# Patient Record
Sex: FEMALE | Race: OTHER | HISPANIC OR LATINO
[De-identification: names, ages, dates, MRNs, and addresses within clinical notes are randomized per-mention and may not be internally consistent; named-entity substitution may affect disease eponyms.]

---

## 2017-11-08 ENCOUNTER — APPOINTMENT (OUTPATIENT)
Dept: SURGICAL ONCOLOGY | Facility: CLINIC | Age: 47
End: 2017-11-08
Payer: COMMERCIAL

## 2017-11-08 VITALS
WEIGHT: 134 LBS | BODY MASS INDEX: 24.66 KG/M2 | OXYGEN SATURATION: 98 % | HEIGHT: 62 IN | TEMPERATURE: 98.1 F | DIASTOLIC BLOOD PRESSURE: 83 MMHG | HEART RATE: 79 BPM | SYSTOLIC BLOOD PRESSURE: 132 MMHG

## 2017-11-08 DIAGNOSIS — Z80.3 FAMILY HISTORY OF MALIGNANT NEOPLASM OF BREAST: ICD-10-CM

## 2017-11-08 DIAGNOSIS — Z87.898 PERSONAL HISTORY OF OTHER SPECIFIED CONDITIONS: ICD-10-CM

## 2017-11-08 DIAGNOSIS — Z87.19 PERSONAL HISTORY OF OTHER DISEASES OF THE DIGESTIVE SYSTEM: ICD-10-CM

## 2017-11-08 PROCEDURE — 99205 OFFICE O/P NEW HI 60 MIN: CPT

## 2017-11-11 ENCOUNTER — TRANSCRIPTION ENCOUNTER (OUTPATIENT)
Age: 47
End: 2017-11-11

## 2017-11-30 ENCOUNTER — CHART COPY (OUTPATIENT)
Age: 47
End: 2017-11-30

## 2017-12-07 ENCOUNTER — LABORATORY RESULT (OUTPATIENT)
Age: 47
End: 2017-12-07

## 2017-12-07 ENCOUNTER — APPOINTMENT (OUTPATIENT)
Dept: SURGICAL ONCOLOGY | Facility: CLINIC | Age: 47
End: 2017-12-07
Payer: COMMERCIAL

## 2017-12-07 DIAGNOSIS — N60.01 SOLITARY CYST OF RIGHT BREAST: ICD-10-CM

## 2017-12-07 DIAGNOSIS — N60.02 SOLITARY CYST OF RIGHT BREAST: ICD-10-CM

## 2017-12-07 PROCEDURE — 10022: CPT

## 2018-03-12 ENCOUNTER — APPOINTMENT (OUTPATIENT)
Dept: SURGICAL ONCOLOGY | Facility: CLINIC | Age: 48
End: 2018-03-12
Payer: COMMERCIAL

## 2018-03-12 VITALS
HEIGHT: 62 IN | SYSTOLIC BLOOD PRESSURE: 151 MMHG | RESPIRATION RATE: 15 BRPM | BODY MASS INDEX: 23.92 KG/M2 | HEART RATE: 87 BPM | DIASTOLIC BLOOD PRESSURE: 101 MMHG | WEIGHT: 130 LBS | OXYGEN SATURATION: 95 %

## 2018-03-12 DIAGNOSIS — C41.9 MALIGNANT NEOPLASM OF BONE AND ARTICULAR CARTILAGE, UNSPECIFIED: ICD-10-CM

## 2018-03-12 PROCEDURE — 99214 OFFICE O/P EST MOD 30 MIN: CPT

## 2018-03-13 ENCOUNTER — OUTPATIENT (OUTPATIENT)
Dept: OUTPATIENT SERVICES | Facility: HOSPITAL | Age: 48
LOS: 1 days | End: 2018-03-13
Payer: COMMERCIAL

## 2018-03-13 ENCOUNTER — RESULT REVIEW (OUTPATIENT)
Age: 48
End: 2018-03-13

## 2018-03-13 ENCOUNTER — APPOINTMENT (OUTPATIENT)
Dept: MAMMOGRAPHY | Facility: IMAGING CENTER | Age: 48
End: 2018-03-13
Payer: COMMERCIAL

## 2018-03-13 ENCOUNTER — APPOINTMENT (OUTPATIENT)
Dept: ULTRASOUND IMAGING | Facility: IMAGING CENTER | Age: 48
End: 2018-03-13

## 2018-03-13 ENCOUNTER — APPOINTMENT (OUTPATIENT)
Dept: ULTRASOUND IMAGING | Facility: IMAGING CENTER | Age: 48
End: 2018-03-13
Payer: COMMERCIAL

## 2018-03-13 DIAGNOSIS — C41.9 MALIGNANT NEOPLASM OF BONE AND ARTICULAR CARTILAGE, UNSPECIFIED: ICD-10-CM

## 2018-03-13 PROCEDURE — 77065 DX MAMMO INCL CAD UNI: CPT

## 2018-03-13 PROCEDURE — 76641 ULTRASOUND BREAST COMPLETE: CPT

## 2018-03-13 PROCEDURE — 19083 BX BREAST 1ST LESION US IMAG: CPT | Mod: LT

## 2018-03-13 PROCEDURE — 88305 TISSUE EXAM BY PATHOLOGIST: CPT | Mod: 26

## 2018-03-13 PROCEDURE — 76641 ULTRASOUND BREAST COMPLETE: CPT | Mod: 26,LT

## 2018-03-13 PROCEDURE — G0279: CPT | Mod: 26

## 2018-03-13 PROCEDURE — 77065 DX MAMMO INCL CAD UNI: CPT | Mod: 26,LT

## 2018-03-13 PROCEDURE — 88377 M/PHMTRC ALYS ISHQUANT/SEMIQ: CPT

## 2018-03-13 PROCEDURE — G0279: CPT

## 2018-03-13 PROCEDURE — 88377 M/PHMTRC ALYS ISHQUANT/SEMIQ: CPT | Mod: 26

## 2018-03-13 PROCEDURE — 77061 BREAST TOMOSYNTHESIS UNI: CPT | Mod: 26,LT

## 2018-03-13 PROCEDURE — A4648: CPT

## 2018-03-13 PROCEDURE — 77065 DX MAMMO INCL CAD UNI: CPT | Mod: 26,77,LT

## 2018-03-13 PROCEDURE — 88305 TISSUE EXAM BY PATHOLOGIST: CPT

## 2018-03-13 PROCEDURE — 19083 BX BREAST 1ST LESION US IMAG: CPT

## 2018-03-13 PROCEDURE — 88360 TUMOR IMMUNOHISTOCHEM/MANUAL: CPT | Mod: 26

## 2018-03-13 PROCEDURE — 88360 TUMOR IMMUNOHISTOCHEM/MANUAL: CPT

## 2018-03-16 ENCOUNTER — APPOINTMENT (OUTPATIENT)
Dept: NUCLEAR MEDICINE | Facility: IMAGING CENTER | Age: 48
End: 2018-03-16

## 2018-03-21 ENCOUNTER — RX RENEWAL (OUTPATIENT)
Age: 48
End: 2018-03-21

## 2018-03-30 ENCOUNTER — APPOINTMENT (OUTPATIENT)
Dept: ORTHOPEDIC SURGERY | Facility: CLINIC | Age: 48
End: 2018-03-30
Payer: COMMERCIAL

## 2018-03-30 VITALS
HEIGHT: 62 IN | DIASTOLIC BLOOD PRESSURE: 80 MMHG | SYSTOLIC BLOOD PRESSURE: 130 MMHG | BODY MASS INDEX: 23.92 KG/M2 | WEIGHT: 130 LBS | HEART RATE: 94 BPM

## 2018-03-30 PROCEDURE — 73552 X-RAY EXAM OF FEMUR 2/>: CPT | Mod: LT

## 2018-03-30 PROCEDURE — 99204 OFFICE O/P NEW MOD 45 MIN: CPT

## 2018-03-30 PROCEDURE — 73060 X-RAY EXAM OF HUMERUS: CPT | Mod: RT

## 2018-03-30 PROCEDURE — 72170 X-RAY EXAM OF PELVIS: CPT

## 2018-04-30 ENCOUNTER — APPOINTMENT (OUTPATIENT)
Dept: ORTHOPEDIC SURGERY | Facility: CLINIC | Age: 48
End: 2018-04-30
Payer: COMMERCIAL

## 2018-04-30 PROCEDURE — 99213 OFFICE O/P EST LOW 20 MIN: CPT

## 2018-07-23 ENCOUNTER — APPOINTMENT (OUTPATIENT)
Dept: ORTHOPEDIC SURGERY | Facility: CLINIC | Age: 48
End: 2018-07-23
Payer: COMMERCIAL

## 2018-07-23 VITALS
HEIGHT: 62 IN | BODY MASS INDEX: 24.29 KG/M2 | HEART RATE: 92 BPM | WEIGHT: 132 LBS | DIASTOLIC BLOOD PRESSURE: 73 MMHG | SYSTOLIC BLOOD PRESSURE: 107 MMHG

## 2018-07-23 PROCEDURE — 72170 X-RAY EXAM OF PELVIS: CPT

## 2018-07-23 PROCEDURE — 99213 OFFICE O/P EST LOW 20 MIN: CPT

## 2018-08-18 ENCOUNTER — INPATIENT (INPATIENT)
Facility: HOSPITAL | Age: 48
LOS: 1 days | Discharge: HOME | DRG: 603 | End: 2018-08-20
Attending: STUDENT IN AN ORGANIZED HEALTH CARE EDUCATION/TRAINING PROGRAM | Admitting: STUDENT IN AN ORGANIZED HEALTH CARE EDUCATION/TRAINING PROGRAM
Payer: COMMERCIAL

## 2018-08-18 VITALS
SYSTOLIC BLOOD PRESSURE: 153 MMHG | HEART RATE: 96 BPM | OXYGEN SATURATION: 100 % | RESPIRATION RATE: 18 BRPM | TEMPERATURE: 98 F | DIASTOLIC BLOOD PRESSURE: 92 MMHG

## 2018-08-18 LAB
ALBUMIN SERPL ELPH-MCNC: 3.8 G/DL — SIGNIFICANT CHANGE UP (ref 3.3–5)
ALP SERPL-CCNC: 49 U/L — SIGNIFICANT CHANGE UP (ref 40–120)
ALT FLD-CCNC: 21 U/L — SIGNIFICANT CHANGE UP (ref 10–45)
ANION GAP SERPL CALC-SCNC: 9 MMOL/L — SIGNIFICANT CHANGE UP (ref 5–17)
APTT BLD: 30 SEC — SIGNIFICANT CHANGE UP (ref 27.5–37.4)
AST SERPL-CCNC: 29 U/L — SIGNIFICANT CHANGE UP (ref 10–40)
BASOPHILS # BLD AUTO: 0 K/UL — SIGNIFICANT CHANGE UP (ref 0–0.2)
BASOPHILS NFR BLD AUTO: 0.3 % — SIGNIFICANT CHANGE UP (ref 0–2)
BILIRUB SERPL-MCNC: 0.3 MG/DL — SIGNIFICANT CHANGE UP (ref 0.2–1.2)
BUN SERPL-MCNC: 11 MG/DL — SIGNIFICANT CHANGE UP (ref 7–23)
CALCIUM SERPL-MCNC: 9 MG/DL — SIGNIFICANT CHANGE UP (ref 8.4–10.5)
CHLORIDE SERPL-SCNC: 104 MMOL/L — SIGNIFICANT CHANGE UP (ref 96–108)
CK MB CFR SERPL CALC: 1 NG/ML — SIGNIFICANT CHANGE UP (ref 0–3.8)
CO2 SERPL-SCNC: 26 MMOL/L — SIGNIFICANT CHANGE UP (ref 22–31)
CREAT SERPL-MCNC: 0.67 MG/DL — SIGNIFICANT CHANGE UP (ref 0.5–1.3)
EOSINOPHIL # BLD AUTO: 0 K/UL — SIGNIFICANT CHANGE UP (ref 0–0.5)
EOSINOPHIL NFR BLD AUTO: 0.4 % — SIGNIFICANT CHANGE UP (ref 0–6)
GLUCOSE SERPL-MCNC: 105 MG/DL — HIGH (ref 70–99)
HCT VFR BLD CALC: 29.8 % — LOW (ref 34.5–45)
HGB BLD-MCNC: 10.1 G/DL — LOW (ref 11.5–15.5)
INR BLD: 1.01 RATIO — SIGNIFICANT CHANGE UP (ref 0.88–1.16)
LYMPHOCYTES # BLD AUTO: 0.9 K/UL — LOW (ref 1–3.3)
LYMPHOCYTES # BLD AUTO: 12.7 % — LOW (ref 13–44)
MCHC RBC-ENTMCNC: 33.5 PG — SIGNIFICANT CHANGE UP (ref 27–34)
MCHC RBC-ENTMCNC: 33.9 GM/DL — SIGNIFICANT CHANGE UP (ref 32–36)
MCV RBC AUTO: 98.8 FL — SIGNIFICANT CHANGE UP (ref 80–100)
MONOCYTES # BLD AUTO: 0.6 K/UL — SIGNIFICANT CHANGE UP (ref 0–0.9)
MONOCYTES NFR BLD AUTO: 8.9 % — SIGNIFICANT CHANGE UP (ref 2–14)
NEUTROPHILS # BLD AUTO: 5.4 K/UL — SIGNIFICANT CHANGE UP (ref 1.8–7.4)
NEUTROPHILS NFR BLD AUTO: 77.7 % — HIGH (ref 43–77)
NT-PROBNP SERPL-SCNC: 48 PG/ML — SIGNIFICANT CHANGE UP (ref 0–300)
PLATELET # BLD AUTO: 376 K/UL — SIGNIFICANT CHANGE UP (ref 150–400)
POTASSIUM SERPL-MCNC: 4.1 MMOL/L — SIGNIFICANT CHANGE UP (ref 3.5–5.3)
POTASSIUM SERPL-SCNC: 4.1 MMOL/L — SIGNIFICANT CHANGE UP (ref 3.5–5.3)
PROT SERPL-MCNC: 6.1 G/DL — SIGNIFICANT CHANGE UP (ref 6–8.3)
PROTHROM AB SERPL-ACNC: 11 SEC — SIGNIFICANT CHANGE UP (ref 9.8–12.7)
RBC # BLD: 3.01 M/UL — LOW (ref 3.8–5.2)
RBC # FLD: 17.6 % — HIGH (ref 10.3–14.5)
SODIUM SERPL-SCNC: 139 MMOL/L — SIGNIFICANT CHANGE UP (ref 135–145)
TROPONIN T, HIGH SENSITIVITY RESULT: <6 NG/L — SIGNIFICANT CHANGE UP (ref 0–51)
WBC # BLD: 6.9 K/UL — SIGNIFICANT CHANGE UP (ref 3.8–10.5)
WBC # FLD AUTO: 6.9 K/UL — SIGNIFICANT CHANGE UP (ref 3.8–10.5)

## 2018-08-18 PROCEDURE — 99285 EMERGENCY DEPT VISIT HI MDM: CPT | Mod: 25

## 2018-08-18 PROCEDURE — 93010 ELECTROCARDIOGRAM REPORT: CPT

## 2018-08-18 PROCEDURE — 71046 X-RAY EXAM CHEST 2 VIEWS: CPT | Mod: 26

## 2018-08-18 NOTE — ED ADULT NURSE NOTE - NSIMPLEMENTINTERV_GEN_ALL_ED
Implemented All Universal Safety Interventions:  [place] to call system. Call bell, personal items and telephone within reach. Instruct patient to call for assistance. Room bathroom lighting operational. Non-slip footwear when patient is off stretcher. Physically safe environment: no spills, clutter or unnecessary equipment. Stretcher in lowest position, wheels locked, appropriate side rails in place.

## 2018-08-18 NOTE — ED PROVIDER NOTE - MUSCULOSKELETAL MINIMAL EXAM
diffuse edema in legs L leg bigger than R, L ext warm and erythema, no open sores noted, some tenderness L inguinal nodes

## 2018-08-18 NOTE — ED ADULT TRIAGE NOTE - CHIEF COMPLAINT QUOTE
hx of breast ca; c/o L leg redness/swelling pain x1 month; pt seen by MD, had US done, no DVT as per pt

## 2018-08-18 NOTE — ED PROVIDER NOTE - ATTENDING CONTRIBUTION TO CARE
I have seen and evaluated this patient with the [place] practice clinician.   I agree with the findings  unless other wise stated. I have amended notes where needed.  After my face to face bedside evaluation, I am notin yr old F being treated for breast CA, last chemo 2 weeks ago, presenting for edema. Possible cellulitis vs DVT. Will do labs.  admission for antibiotics.

## 2018-08-18 NOTE — ED PROVIDER NOTE - OBJECTIVE STATEMENT
48 yr old F with PMHx of breast CA. Presents for edema. Pt notes bilateral leg swelling, redness, and pain, L side more than R side.

## 2018-08-18 NOTE — ED PROVIDER NOTE - MEDICAL DECISION MAKING DETAILS
48 yr old F being treated for breast CA, last chemo 2 weeks ago, presenting for edema. Possible cellulitis vs DVT. Will do labs. Possible admission for antibiotics.

## 2018-08-19 DIAGNOSIS — C50.919 MALIGNANT NEOPLASM OF UNSPECIFIED SITE OF UNSPECIFIED FEMALE BREAST: ICD-10-CM

## 2018-08-19 DIAGNOSIS — L03.119 CELLULITIS OF UNSPECIFIED PART OF LIMB: ICD-10-CM

## 2018-08-19 DIAGNOSIS — Z29.9 ENCOUNTER FOR PROPHYLACTIC MEASURES, UNSPECIFIED: ICD-10-CM

## 2018-08-19 DIAGNOSIS — L03.116 CELLULITIS OF LEFT LOWER LIMB: ICD-10-CM

## 2018-08-19 LAB
ALBUMIN SERPL ELPH-MCNC: 3.5 G/DL — SIGNIFICANT CHANGE UP (ref 3.3–5)
ALP SERPL-CCNC: 42 U/L — SIGNIFICANT CHANGE UP (ref 40–120)
ALT FLD-CCNC: 18 U/L — SIGNIFICANT CHANGE UP (ref 10–45)
ANION GAP SERPL CALC-SCNC: 10 MMOL/L — SIGNIFICANT CHANGE UP (ref 5–17)
AST SERPL-CCNC: 25 U/L — SIGNIFICANT CHANGE UP (ref 10–40)
BASE EXCESS BLDV CALC-SCNC: 3 MMOL/L — HIGH (ref -2–2)
BASOPHILS # BLD AUTO: 0 K/UL — SIGNIFICANT CHANGE UP (ref 0–0.2)
BASOPHILS NFR BLD AUTO: 0.4 % — SIGNIFICANT CHANGE UP (ref 0–2)
BILIRUB SERPL-MCNC: 0.3 MG/DL — SIGNIFICANT CHANGE UP (ref 0.2–1.2)
BUN SERPL-MCNC: 13 MG/DL — SIGNIFICANT CHANGE UP (ref 7–23)
CA-I SERPL-SCNC: 1.25 MMOL/L — SIGNIFICANT CHANGE UP (ref 1.12–1.3)
CALCIUM SERPL-MCNC: 8.5 MG/DL — SIGNIFICANT CHANGE UP (ref 8.4–10.5)
CHLORIDE BLDV-SCNC: 105 MMOL/L — SIGNIFICANT CHANGE UP (ref 96–108)
CHLORIDE SERPL-SCNC: 105 MMOL/L — SIGNIFICANT CHANGE UP (ref 96–108)
CO2 BLDV-SCNC: 30 MMOL/L — SIGNIFICANT CHANGE UP (ref 22–30)
CO2 SERPL-SCNC: 22 MMOL/L — SIGNIFICANT CHANGE UP (ref 22–31)
CREAT SERPL-MCNC: 0.58 MG/DL — SIGNIFICANT CHANGE UP (ref 0.5–1.3)
EOSINOPHIL # BLD AUTO: 0.1 K/UL — SIGNIFICANT CHANGE UP (ref 0–0.5)
EOSINOPHIL NFR BLD AUTO: 0.9 % — SIGNIFICANT CHANGE UP (ref 0–6)
GAS PNL BLDV: 141 MMOL/L — SIGNIFICANT CHANGE UP (ref 136–145)
GAS PNL BLDV: SIGNIFICANT CHANGE UP
GAS PNL BLDV: SIGNIFICANT CHANGE UP
GLUCOSE BLDV-MCNC: 95 MG/DL — SIGNIFICANT CHANGE UP (ref 70–99)
GLUCOSE SERPL-MCNC: 93 MG/DL — SIGNIFICANT CHANGE UP (ref 70–99)
HCO3 BLDV-SCNC: 28 MMOL/L — SIGNIFICANT CHANGE UP (ref 21–29)
HCT VFR BLD CALC: 28 % — LOW (ref 34.5–45)
HCT VFR BLDA CALC: 29 % — LOW (ref 39–50)
HGB BLD CALC-MCNC: 9.4 G/DL — LOW (ref 11.5–15.5)
HGB BLD-MCNC: 9.5 G/DL — LOW (ref 11.5–15.5)
LACTATE BLDV-MCNC: 0.6 MMOL/L — LOW (ref 0.7–2)
LYMPHOCYTES # BLD AUTO: 0.8 K/UL — LOW (ref 1–3.3)
LYMPHOCYTES # BLD AUTO: 14 % — SIGNIFICANT CHANGE UP (ref 13–44)
MAGNESIUM SERPL-MCNC: 2.2 MG/DL — SIGNIFICANT CHANGE UP (ref 1.6–2.6)
MCHC RBC-ENTMCNC: 33.1 PG — SIGNIFICANT CHANGE UP (ref 27–34)
MCHC RBC-ENTMCNC: 33.9 GM/DL — SIGNIFICANT CHANGE UP (ref 32–36)
MCV RBC AUTO: 97.5 FL — SIGNIFICANT CHANGE UP (ref 80–100)
MONOCYTES # BLD AUTO: 0.6 K/UL — SIGNIFICANT CHANGE UP (ref 0–0.9)
MONOCYTES NFR BLD AUTO: 9.7 % — SIGNIFICANT CHANGE UP (ref 2–14)
NEUTROPHILS # BLD AUTO: 4.3 K/UL — SIGNIFICANT CHANGE UP (ref 1.8–7.4)
NEUTROPHILS NFR BLD AUTO: 75 % — SIGNIFICANT CHANGE UP (ref 43–77)
OTHER CELLS CSF MANUAL: 10 ML/DL — LOW (ref 18–22)
PCO2 BLDV: 49 MMHG — SIGNIFICANT CHANGE UP (ref 35–50)
PH BLDV: 7.38 — SIGNIFICANT CHANGE UP (ref 7.35–7.45)
PHOSPHATE SERPL-MCNC: 3.6 MG/DL — SIGNIFICANT CHANGE UP (ref 2.5–4.5)
PLATELET # BLD AUTO: 345 K/UL — SIGNIFICANT CHANGE UP (ref 150–400)
PO2 BLDV: 43 MMHG — SIGNIFICANT CHANGE UP (ref 25–45)
POTASSIUM BLDV-SCNC: 3.8 MMOL/L — SIGNIFICANT CHANGE UP (ref 3.5–5.3)
POTASSIUM SERPL-MCNC: 3.9 MMOL/L — SIGNIFICANT CHANGE UP (ref 3.5–5.3)
POTASSIUM SERPL-SCNC: 3.9 MMOL/L — SIGNIFICANT CHANGE UP (ref 3.5–5.3)
PROCALCITONIN SERPL-MCNC: 0.02 NG/ML — SIGNIFICANT CHANGE UP (ref 0.02–0.1)
PROT SERPL-MCNC: 5.6 G/DL — LOW (ref 6–8.3)
RBC # BLD: 2.87 M/UL — LOW (ref 3.8–5.2)
RBC # FLD: 16.9 % — HIGH (ref 10.3–14.5)
SAO2 % BLDV: 73 % — SIGNIFICANT CHANGE UP (ref 67–88)
SODIUM SERPL-SCNC: 137 MMOL/L — SIGNIFICANT CHANGE UP (ref 135–145)
WBC # BLD: 5.8 K/UL — SIGNIFICANT CHANGE UP (ref 3.8–10.5)
WBC # FLD AUTO: 5.8 K/UL — SIGNIFICANT CHANGE UP (ref 3.8–10.5)

## 2018-08-19 PROCEDURE — 93970 EXTREMITY STUDY: CPT | Mod: 26

## 2018-08-19 PROCEDURE — 99223 1ST HOSP IP/OBS HIGH 75: CPT

## 2018-08-19 RX ORDER — AMPICILLIN SODIUM AND SULBACTAM SODIUM 250; 125 MG/ML; MG/ML
3 INJECTION, POWDER, FOR SUSPENSION INTRAMUSCULAR; INTRAVENOUS EVERY 6 HOURS
Qty: 0 | Refills: 0 | Status: DISCONTINUED | OUTPATIENT
Start: 2018-08-19 | End: 2018-08-20

## 2018-08-19 RX ORDER — ENOXAPARIN SODIUM 100 MG/ML
40 INJECTION SUBCUTANEOUS EVERY 24 HOURS
Qty: 0 | Refills: 0 | Status: DISCONTINUED | OUTPATIENT
Start: 2018-08-19 | End: 2018-08-20

## 2018-08-19 RX ORDER — AMPICILLIN SODIUM AND SULBACTAM SODIUM 250; 125 MG/ML; MG/ML
3 INJECTION, POWDER, FOR SUSPENSION INTRAMUSCULAR; INTRAVENOUS ONCE
Qty: 0 | Refills: 0 | Status: COMPLETED | OUTPATIENT
Start: 2018-08-19 | End: 2018-08-19

## 2018-08-19 RX ADMIN — AMPICILLIN SODIUM AND SULBACTAM SODIUM 200 GRAM(S): 250; 125 INJECTION, POWDER, FOR SUSPENSION INTRAMUSCULAR; INTRAVENOUS at 12:33

## 2018-08-19 RX ADMIN — AMPICILLIN SODIUM AND SULBACTAM SODIUM 200 GRAM(S): 250; 125 INJECTION, POWDER, FOR SUSPENSION INTRAMUSCULAR; INTRAVENOUS at 01:35

## 2018-08-19 RX ADMIN — AMPICILLIN SODIUM AND SULBACTAM SODIUM 200 GRAM(S): 250; 125 INJECTION, POWDER, FOR SUSPENSION INTRAMUSCULAR; INTRAVENOUS at 18:23

## 2018-08-19 RX ADMIN — Medication 100 MILLIGRAM(S): at 06:50

## 2018-08-19 NOTE — H&P ADULT - NSHPLABSRESULTS_GEN_ALL_CORE
Labs personally reviewed  no leukocytosis, mild anemia, rest of cbc unrevealing, coags wnl, cmp wnl, cardiac enzymes wnl, probnp wnl, procalcitonin wnl.   Imaging personally reviewed CXR prelim clear lungs. Doppler b/l LE negative for DVT.  EKG personally reviewed NSR no overt ischemia.

## 2018-08-19 NOTE — CONSULT NOTE ADULT - CONSULT REASON
metastatic breast cancer on pallitative chemo given last 2 weeks ago now with  a cellulitis of the left leg

## 2018-08-19 NOTE — H&P ADULT - ASSESSMENT
48 F PMH L sided breast cancer (dx 3/2018, invasive poorly differentiated DCIS, ER/CT + HER2 equivocal), on chemo, Osseous mets s/p RT to L hip, p/w LE edema.

## 2018-08-19 NOTE — H&P ADULT - NSHPREVIEWOFSYSTEMS_GEN_ALL_CORE
REVIEW OF SYSTEMS:  CONSTITUTIONAL: No weakness. No fevers. No chills. No weight loss. Good appetite.  EYES: No visual changes. No eye pain.  ENT: No hearing difficulty. No vertigo. No dysphagia. No Sinusitis/rhinorrhea.  NECK: No pain. No stiffness/rigidity.  CARDIAC: No chest pain. No palpitations.  RESPIRATORY: No cough. No SOB. No hemoptysis.  GASTROINTESTINAL: No abdominal pain. No nausea. No vomiting. No hematemesis. No diarrhea. No constipation. No melena. No hematochezia.  GENITOURINARY: No dysuria. No frequency. No hesitancy. No hematuria.  NEUROLOGICAL: No numbness. No focal weakness. No incontinence. No headache.  BACK: No back pain.  EXTREMITIES: +LE edema. +ttp.   SKIN: No whole body rashes.  No itching. No other lesions except noted in HPI.  PSYCHIATRIC: No depression. No anxiety. No SI/HI.  ALLERGIC: No lip swelling. No hives.  All other review of systems is negative unless indicated above.

## 2018-08-19 NOTE — H&P ADULT - HISTORY OF PRESENT ILLNESS
48 F PMH L sided breast cancer (dx 3/2018, invasive poorly differentiated DCIS, ER/NE + HER2 equivocal), on chemo, Osseous mets s/p RT to L hip, p/w LE edema. Last chemo 2 wks ago.     VS: 98.2, 84, 115/72, 16, 98% RA.  Labs: no leukocytosis, mild anemia, rest of cbc unrevealing, coags wnl, cmp unrevealing, trop wnl, procal wnl, probnp wnl, vbg nondx, lactate wnl.  CXR prelim clear lungs. Dopplers b/l LE neg for DVT. In ER pt received one dose unasyn prior to medicine team involvement. 48 F PMH L sided breast cancer (dx 3/2018, invasive poorly differentiated DCIS, ER/CT + HER2 equivocal), on chemo, Osseous mets s/p RT to L hip, p/w LE edema. Last chemo 2 wks ago. Pt is unsure of what chemo she is on; she knows she gets it q3 weeks, and has had her 6th cycle in total.  Pt states she first had RT to her L hip in March, and then in April, she began her current chemo regimen. She notes no issues with her prior 4 cycles of chemo. About 1 month ago, she noticed progressive edema of L leg. +redness in localized region of L inner thigh. +warmth. +tenderness. Pt remained ambulatory. Denies any purulent drainage. Soon after her most recent 6th cycle of chemo, she noticed edema starting in her R leg, mostly in the distal ankle/foot region.    VS: 98.2, 84, 115/72, 16, 98% RA.  Labs: no leukocytosis, mild anemia, rest of cbc unrevealing, coags wnl, cmp unrevealing, trop wnl, procal wnl, probnp wnl, vbg nondx, lactate wnl.  CXR prelim clear lungs. Dopplers b/l LE neg for DVT. In ER pt received one dose unasyn prior to medicine team involvement.  She denies cp/sob, denies f/c, denies n/v/d. Denies bites, denies trauma to either leg. Denies travel or prolonged immobilization. Denies damon/orthopnea.     VS: 98.2, 84, 115/72, 16, 98% RA. Labs: no leukocytosis, mild anemia, rest of cbc unrevealing, coags wnl, cmp wnl, cardiac enzymes wnl, probnp wnl, procalcitonin wnl. CXR prelim clear lungs. Doppler b/l LE negative for DVT. In ER pt received one dose unasyn prior to medicine team involvement.

## 2018-08-19 NOTE — H&P ADULT - PROBLEM SELECTOR PLAN 1
-pt with clinical signs of cellulitis including redness, warmth, and tenderness; however, no systemic involvement; afebrile, no leukocytosis, no purulence/drainage/fluctuance/crepitus, procal not elevated.   -s/p 1 dose unasyn in ER  -etio unclear at this point; ddx includes true cellulitis vs. radiation induced cellulitis vs. DVT (ruled out with negative dopplers) vs. chemo effect.   -Given that pt is immunocompromised, would favor empiric abx; at this time pt does not appear toxic. Start IV clindamycin and reassess clinically  -f/u bcx  -ID eval in am, defer to day team to call consult

## 2018-08-19 NOTE — ED ADULT NURSE REASSESSMENT NOTE - NS ED NURSE REASSESS COMMENT FT1
pt transported to US. will obtain second set of blood cultures upon arrival back to ED and start IV antibiotics.

## 2018-08-19 NOTE — CONSULT NOTE ADULT - SUBJECTIVE AND OBJECTIVE BOX
Patient is a 48y old  Female who presents with a chief complaint of LE redness (19 Aug 2018 02:03)      HPI:  48 F PMH L sided breast cancer (dx 3/2018, invasive poorly differentiated DCIS, ER/[place] + HER2 equivocal), on chemo, Osseous mets s/p RT to L hip, p/w LE edema. Last chemo 2 wks ago. Pt is unsure of what chemo she is on; she knows she gets it q3 weeks, and has had her 6th cycle in total.  Pt states she first had RT to her L hip in March, and then in April, she began her current chemo regimen. She notes no issues with her prior 4 cycles of chemo. About 1 month ago, she noticed progressive edema of L leg. +redness in localized region of L inner thigh. +warmth. +tenderness. Pt remained ambulatory. Denies any purulent drainage. Soon after her most recent 6th cycle of chemo, she noticed edema starting in her R leg, mostly in the distal ankle/foot region.      The above notes were in the chart at the time of my arrival and I reviewed them. The pt according to her had her last chemotherapy 2 weeks ago and she is not certain as to what she received. The pt told me that she is to have a ct of the body in the next few weeks but is hesitant to start any treatments. The pt developed the above cellulitis symptoms and at the time of my visit did not have any symptoms of infection. The white cell count was normal and she was afebrile. She is to be evaluated by ID but it seems likely that she will be sent home soon on oral antibiotics. She will follow up with Dr Edmonds, her oncologist.  Vital Signs Last 24 Hrs  T(C): 36.6 (19 Aug 2018 09:55), Max: 36.8 (18 Aug 2018 21:31)  T(F): 97.8 (19 Aug 2018 09:55), Max: 98.2 (18 Aug 2018 21:31)  HR: 70 (19 Aug 2018 09:55) (70 - 96)  BP: 116/72 (19 Aug 2018 09:55) (108/70 - 153/92)  BP(mean): --  RR: 18 (19 Aug 2018 09:55) (16 - 18)  SpO2: 96% (19 Aug 2018 09:55) (95% - 100%)  VS: 98.2, 84, 115/72, 16, 98% RA. Labs: no leukocytosis, mild anemia, rest of cbc unrevealing, coags wnl, cmp wnl, cardiac enzymes wnl, probnp wnl, procalcitonin wnl. CXR prelim clear lungs. Doppler b/l LE negative for DVT. In ER pt received one dose unasyn prior to medicine team involvement. (19 Aug 2018 02:03)  MEDICATIONS  (STANDING):  ampicillin/sulbactam  IVPB 3 Gram(s) IV Intermittent every 6 hours  enoxaparin Injectable 40 milliGRAM(s) SubCutaneous every 24 hours        T(F): 97.8 (08-19-18 @ 09:55), Max: 98.2 (08-18-18 @ 21:31)  HR: 70 (08-19-18 @ 09:55) (70 - 96)  BP: 116/72 (08-19-18 @ 09:55) (108/70 - 153/92)  RR: 18 (08-19-18 @ 09:55) (16 - 18)  SpO2: 96% (08-19-18 @ 09:55) (95% - 100%)    LABS:    CBC Full  -  ( 19 Aug 2018 07:42 )  WBC Count : 5.8 K/uL  Hemoglobin : 9.5 g/dL  Hematocrit : 28.0 %  Platelet Count - Automated : 345 K/uL  Mean Cell Volume : 97.5 fl  Mean Cell Hemoglobin : 33.1 pg  Mean Cell Hemoglobin Concentration : 33.9 gm/dL  Auto Neutrophil # : 4.3 K/uL  Auto Lymphocyte # : 0.8 K/uL  Auto Monocyte # : 0.6 K/uL  Auto Eosinophil # : 0.1 K/uL  Auto Basophil # : 0.0 K/uL  Auto Neutrophil % : 75.0 %  Auto Lymphocyte % : 14.0 %  Auto Monocyte % : 9.7 %  Auto Eosinophil % : 0.9 %  Auto Basophil % : 0.4 %    08-19    137  |  105  |  13  ----------------------------<  93  3.9   |  22  |  0.58    Ca    8.5      19 Aug 2018 09:02  Phos  3.6     08-19  Mg     2.2     08-19    TPro  5.6<L>  /  Alb  3.5  /  TBili  0.3  /  DBili  x   /  AST  25  /  ALT  18  /  AlkPhos  42  08-19    PT/INR - ( 18 Aug 2018 22:01 )   PT: 11.0 sec;   INR: 1.01 ratio         PTT - ( 18 Aug 2018 22:01 )  PTT:30.0 sec      ROS:  Negative except for:    PAST MEDICAL & SURGICAL HISTORY:  Breast cancer  No significant past surgical history      SOCIAL HISTORY:    FAMILY HISTORY:  Family history of breast cancer (Aunt)      Allergies    No Known Allergies    Intolerances        PHYSICAL EXAM  General: adult in NAD  HEENT: clear oropharynx, anicteric sclera, pink conjunctivae  Neck: supple  CV: normal S1S2 with no murmur rubs or gallops  Lungs: clear to auscultation, no wheezes, no rales  Abdomen: soft non-tender non-distended, no hepato/splenomegaly  Ext: no clubbing cyanosis or edema  Skin: no rashes and no petechiae the above rash/cellulitis was no longer visible at the time of my visit.  Neuro: alert and oriented X3 no focal deficits    BLOOD SMEAR INTERPRETATION:    RADIOLOGY :

## 2018-08-19 NOTE — H&P ADULT - NSHPPHYSICALEXAM_GEN_ALL_CORE
PHYSICAL EXAM:  GENERAL: NAD, well-groomed, well-developed/not cachectic  HEAD:  Atraumatic, Normocephalic, hair loss 2/2 chemo  EYES: EOMI, PERRLA, conjunctiva and sclera clear  ENMT: No tonsillar erythema, exudates, or enlargement; Moist mucous membranes, Good dentition, No lesions  NECK: Supple, No JVD, Normal thyroid  CHEST/LUNG: Clear to percussion bilaterally; No rales, rhonchi, wheezing, or rubs no resp distress or acc musc usage  HEART: Regular rate and rhythm; No murmurs, rubs, or gallops 3+ pitting edema of LLE to knees, 1+ of RLE ankles  ABDOMEN: Soft, Nontender, Nondistended; Bowel sounds present no rebound/guarding  EXTREMITIES:  2+ Peripheral Pulses, No clubbing, cyanosis  LYMPH: No lymphadenopathy noted  SKIN: L leg inner thigh 6x4 area of mild redness, slightly tender to touch, no crepitus, no fluctuance, no purulence. no sig redness on RLE  NERVOUS SYSTEM:  Alert & Oriented X3, Good concentration; Motor Strength 5/5 B/L upper and lower extremities

## 2018-08-19 NOTE — CONSULT NOTE ADULT - SUBJECTIVE AND OBJECTIVE BOX
[facility], Division of Infectious Diseases  SAVANAH Landaverde A. Lee    LARISSA, DAVID  48y, Female  [id number]    HPI--  48F with history of metastatic breast cancer on chemotherapy and XRT to left hip presents with increasing swelling of LLE for 3 weeks and 1-2 days of increasing pain medial thigh and groin accompanied by erythema. No history of trauma/strain. No fevers, chills, or rigors. No other complaints at this time.    Here given broad spectrum antibiotics with Amp/SB and then clindamycin. Patient states pain much better with <24h abx. Duplex US without LE DVT.    PMH/PSH--  Breast cancer  No significant past surgical history      Allergies-- denies.       Medications--  Antibiotics: clindamycin IVPB      clindamycin IVPB 600 milliGRAM(s) IV Intermittent every 8 hours  Denies recent antibiotic use PTA    Immunologic:   Other: enoxaparin Injectable      Social History--  EtOH: denies   Tobacco: denies   Drug Use: denies     Family/Marital History--   with children. No sick contacts.   Family history of breast cancer (Aunt)    Remainder not relevant to clinical concern.    Travel/Environmental/Occupational History:  Homemaker    Review of Systems:  A >=10-point review of systems was obtained.   Review of systems otherwise negative except as previously noted.    Physical Exam--  Vital Signs: T(F): 97.8 (08-19-18 @ 09:55), Max: 98.2 (08-18-18 @ 21:31)  HR: 70 (08-19-18 @ 09:55)  BP: 116/72 (08-19-18 @ 09:55)  RR: 18 (08-19-18 @ 09:55)  SpO2: 96% (08-19-18 @ 09:55)  Wt(kg): --  General: Nontoxic-appearing Female in no acute distress.  HEENT: [place]. PERRL. EOMI. Anicteric. Conjunctiva pink and moist. Oropharynx clear. Dentition fair.  Neck: Not rigid. No sense of mass.  Nodes: None palpable.  Lungs: Clear bilaterally without rales, wheezing or rhonchi  Heart: Regular rate and rhythm. No Murmur. No rub. No gallop. No palpable thrill.  Abdomen: Bowel sounds present and normoactive. Soft. Nondistended. Nontender. No sense of mass. No organomegaly.  Back: No spinal tenderness. No costovertebral angle tenderness.   Extremities: No cyanosis or clubbing. 2+ pitting LLE edema thigh to foot. Minimal erythema medial thigh with minimal calor and no tenderness today. No anesthesia, bullae or orhter findings concerning for deep infection.    Skin: Warm. Dry. Good turgor. No rash. No vasculitic stigmata.  Psychiatric: Appropriate affect and mood for situation.         Laboratory & Imaging Data--  CBC                        9.5    5.8   )-----------( 345      ( 19 Aug 2018 07:42 )             28.0       Chemistries  08-19    137  |  105  |  13  ----------------------------<  93  3.9   |  22  |  0.58    Ca    8.5      19 Aug 2018 09:02  Phos  3.6     08-19  Mg     2.2     08-19    TPro  5.6<L>  /  Alb  3.5  /  TBili  0.3  /  DBili  x   /  AST  25  /  ALT  18  /  AlkPhos  42  08-19      Culture Data  None as of yet    < from: [facility] Duplex Lower Ext Vein Scan, Bilat (08.19.18 @ 01:00) >  IMPRESSION:     No evidence of bilateral lower extremity deep venous thrombosis.        < end of copied text >      < from: Xray Chest 2 Views PA/Lat (08.18.18 @ 23:08) >    IMPRESSION:    Clear lungs.    < end of copied text >

## 2018-08-19 NOTE — CONSULT NOTE ADULT - ASSESSMENT
Resolving cellulitis LLE  This patient's cellulitis does not have any purulent drainage, exudate or associated abscess. As such, empiric therapy vs. MRSA is typically not recommended. The vast majority of cases of nonpurulent cellulitis are due to beta-hemolytic streptococci. Cure rates are very high with beta-lactam therapy, even when the organism is not able to be cultured or discerned serologically. Patient is on chemotherapy but no concern deep infection, nor concern of GNR infection (e.g. w ecthyma gangrenosum/P. aeruginosa)    Suggestions--  Unasyn 3q6  Serial exams  F/U Cx data  Hope to transition to PO antibiotics tomorrow.    D/W Dr. Dayne Hernandez MD  [phone]

## 2018-08-19 NOTE — CONSULT NOTE ADULT - ASSESSMENT
The pt according to her had her last chemotherapy 2 weeks ago and she is not certain as to what she received. The pt told me that she is to have a ct of the body in the next few weeks but is hesitant to start any treatments. The pt developed the above cellulitis symptoms and at the time of my visit did not have any symptoms of infection. The white cell count was normal and she was afebrile. She is to be evaluated by ID but it seems likely that she will be sent home soon on oral antibiotics. She will follow up with Dr Edmonds, her oncologist. The pt ;is well past her silvina count from the chemo.

## 2018-08-19 NOTE — H&P ADULT - NSHPSOCIALHISTORY_GEN_ALL_CORE
Social History:      Occupation: disability, used to work in "[employer]      Substance Use (street drugs): ( x ) never used  (  ) other:  Tobacco Usage:  ( x  ) never smoked   (   ) former smoker   (   ) current smoker  (     ) pack year  (        ) last cigarette date  Alcohol Usage: denies

## 2018-08-19 NOTE — PATIENT PROFILE ADULT. - LANGUAGE ASSISTANCE NEEDED
No-Patient/Caregiver offered and refused free interpretation services./patient able to speAKE ENGLISH

## 2018-08-19 NOTE — H&P ADULT - ATTENDING COMMENTS
Care to be assumed by [facility]  hospitalist at 8 am.  This patient was assigned to me by the hospitalist in charge; my involvement in this case has consisted of the initial history, physical, chart review, and management plan.  This patient was previously unknown to me.  Case endorsed to NP ______ at ____. Care to be assumed by [facility]  hospitalist at 8 am.  This patient was assigned to me by the hospitalist in charge; my involvement in this case has consisted of the initial history, physical, chart review, and management plan.  This patient was previously unknown to me.  Case endorsed to LIBRA benavides at 5 am.

## 2018-08-20 ENCOUNTER — TRANSCRIPTION ENCOUNTER (OUTPATIENT)
Age: 48
End: 2018-08-20

## 2018-08-20 VITALS
HEART RATE: 79 BPM | OXYGEN SATURATION: 99 % | DIASTOLIC BLOOD PRESSURE: 72 MMHG | SYSTOLIC BLOOD PRESSURE: 107 MMHG | RESPIRATION RATE: 18 BRPM | TEMPERATURE: 97 F

## 2018-08-20 LAB
HCT VFR BLD CALC: 30.9 % — LOW (ref 34.5–45)
HGB BLD-MCNC: 10.1 G/DL — LOW (ref 11.5–15.5)
MCHC RBC-ENTMCNC: 32.3 PG — SIGNIFICANT CHANGE UP (ref 27–34)
MCHC RBC-ENTMCNC: 32.6 GM/DL — SIGNIFICANT CHANGE UP (ref 32–36)
MCV RBC AUTO: 99.2 FL — SIGNIFICANT CHANGE UP (ref 80–100)
PLATELET # BLD AUTO: 381 K/UL — SIGNIFICANT CHANGE UP (ref 150–400)
RBC # BLD: 3.11 M/UL — LOW (ref 3.8–5.2)
RBC # FLD: 17.6 % — HIGH (ref 10.3–14.5)
WBC # BLD: 5.1 K/UL — SIGNIFICANT CHANGE UP (ref 3.8–10.5)
WBC # FLD AUTO: 5.1 K/UL — SIGNIFICANT CHANGE UP (ref 3.8–10.5)

## 2018-08-20 PROCEDURE — 71046 X-RAY EXAM CHEST 2 VIEWS: CPT

## 2018-08-20 PROCEDURE — 84484 ASSAY OF TROPONIN QUANT: CPT

## 2018-08-20 PROCEDURE — 84100 ASSAY OF PHOSPHORUS: CPT

## 2018-08-20 PROCEDURE — 82435 ASSAY OF BLOOD CHLORIDE: CPT

## 2018-08-20 PROCEDURE — 85730 THROMBOPLASTIN TIME PARTIAL: CPT

## 2018-08-20 PROCEDURE — 83605 ASSAY OF LACTIC ACID: CPT

## 2018-08-20 PROCEDURE — 82947 ASSAY GLUCOSE BLOOD QUANT: CPT

## 2018-08-20 PROCEDURE — 80053 COMPREHEN METABOLIC PANEL: CPT

## 2018-08-20 PROCEDURE — 82330 ASSAY OF CALCIUM: CPT

## 2018-08-20 PROCEDURE — 83735 ASSAY OF MAGNESIUM: CPT

## 2018-08-20 PROCEDURE — 87040 BLOOD CULTURE FOR BACTERIA: CPT

## 2018-08-20 PROCEDURE — 85610 PROTHROMBIN TIME: CPT

## 2018-08-20 PROCEDURE — 85027 COMPLETE CBC AUTOMATED: CPT

## 2018-08-20 PROCEDURE — 85014 HEMATOCRIT: CPT

## 2018-08-20 PROCEDURE — 99285 EMERGENCY DEPT VISIT HI MDM: CPT

## 2018-08-20 PROCEDURE — 82553 CREATINE MB FRACTION: CPT

## 2018-08-20 PROCEDURE — 93970 EXTREMITY STUDY: CPT

## 2018-08-20 PROCEDURE — 84132 ASSAY OF SERUM POTASSIUM: CPT

## 2018-08-20 PROCEDURE — 82803 BLOOD GASES ANY COMBINATION: CPT

## 2018-08-20 PROCEDURE — 83880 ASSAY OF NATRIURETIC PEPTIDE: CPT

## 2018-08-20 PROCEDURE — 84295 ASSAY OF SERUM SODIUM: CPT

## 2018-08-20 PROCEDURE — 93005 ELECTROCARDIOGRAM TRACING: CPT

## 2018-08-20 PROCEDURE — 84145 PROCALCITONIN (PCT): CPT

## 2018-08-20 RX ADMIN — AMPICILLIN SODIUM AND SULBACTAM SODIUM 200 GRAM(S): 250; 125 INJECTION, POWDER, FOR SUSPENSION INTRAMUSCULAR; INTRAVENOUS at 06:18

## 2018-08-20 RX ADMIN — AMPICILLIN SODIUM AND SULBACTAM SODIUM 200 GRAM(S): 250; 125 INJECTION, POWDER, FOR SUSPENSION INTRAMUSCULAR; INTRAVENOUS at 00:32

## 2018-08-20 RX ADMIN — AMPICILLIN SODIUM AND SULBACTAM SODIUM 200 GRAM(S): 250; 125 INJECTION, POWDER, FOR SUSPENSION INTRAMUSCULAR; INTRAVENOUS at 12:01

## 2018-08-20 NOTE — PROGRESS NOTE ADULT - ASSESSMENT
. 8/20 pt is stable and no rash on legs and will likely go home on this date on antibiotics and she will follow with her heme onc. Spoke to Dr Edmonds her oncologist about the stay in the hospital and she will see him in three days.

## 2018-08-20 NOTE — DISCHARGE NOTE ADULT - PLAN OF CARE
Resolution Take all of your antibiotics as ordered.  Call your Health Care Provider within two days of arriving home to make a follow up appointment within one week.  If the affected cellulitic area increases in redness, warmth, pain or swelling call your Health Care Provider.  If you develop fever, chills, and/or malaise, call your Health Care Provider. F/U with your oncologist

## 2018-08-20 NOTE — PROGRESS NOTE ADULT - ASSESSMENT
48 F PMH L sided breast cancer (dx 3/2018, invasive poorly differentiated DCIS, ER/DE + HER2 equivocal), on chemo, Osseous mets s/p RT to L hip, p/w LE edema.

## 2018-08-20 NOTE — DISCHARGE NOTE ADULT - PATIENT PORTAL LINK FT
You can access the [facility] Patient Portal, offered by [facility], by registering with the following website: [email or web address]

## 2018-08-20 NOTE — PROGRESS NOTE ADULT - SUBJECTIVE AND OBJECTIVE BOX
Patient is a 48y old  Female who presents with a chief complaint of LE redness (19 Aug 2018 02:03)      HPI:  48 F PMH L sided breast cancer (dx 3/2018, invasive poorly differentiated DCIS, ER/[place] + HER2 equivocal), on chemo, Osseous mets s/p RT to L hip, p/w LE edema. Last chemo 2 wks ago. Pt is unsure of what chemo she is on; she knows she gets it q3 weeks, and has had her 6th cycle in total.  Pt states she first had RT to her L hip in March, and then in April, she began her current chemo regimen. She notes no issues with her prior 4 cycles of chemo. About 1 month ago, she noticed progressive edema of L leg. +redness in localized region of L inner thigh. +warmth. +tenderness. Pt remained ambulatory. Denies any purulent drainage. Soon after her most recent 6th cycle of chemo, she noticed edema starting in her R leg, mostly in the distal ankle/foot region.      The above notes were in the chart at the time of my arrival and I reviewed them. The pt according to her had her last chemotherapy 2 weeks ago and she is not certain as to what she received. The pt told me that she is to have a ct of the body in the next few weeks but is hesitant to start any treatments. The pt developed the above cellulitis symptoms and at the time of my visit did not have any symptoms of infection. The white cell count was normal and she was afebrile. She is to be evaluated by ID but it seems likely that she will be sent home soon on oral antibiotics. She will follow up with Dr Edmonds, her oncologist. 8/20 pt is stable and no rash on legs and will likely go home on this date on antibiotics and she will follow with her heme onc. Spoke to Dr Edmonds her oncologist about the stay in the hospital and she will see him in three days.  Vital Signs Last 24 Hrs  T(C): 36.3 (20 Aug 2018 14:10), Max: 37 (19 Aug 2018 17:22)  T(F): 97.4 (20 Aug 2018 14:10), Max: 98.6 (19 Aug 2018 17:22)  HR: 79 (20 Aug 2018 14:10) (73 - 86)  BP: 107/72 (20 Aug 2018 14:10) (98/62 - 143/81)  BP(mean): --  RR: 18 (20 Aug 2018 14:10) (18 - 18)  SpO2: 99% (20 Aug 2018 14:10) (97% - 99%)  MEDICATIONS  (STANDING):  ampicillin/sulbactam  IVPB 3 Gram(s) IV Intermittent every 6 hours  enoxaparin Injectable 40 milliGRAM(s) SubCutaneous every 24 hours      CBC Full  -  ( 19 Aug 2018 07:42 )  WBC Count : 5.8 K/uL  Hemoglobin : 9.5 g/dL  Hematocrit : 28.0 %  Platelet Count - Automated : 345 K/uL  Mean Cell Volume : 97.5 fl  Mean Cell Hemoglobin : 33.1 pg  Mean Cell Hemoglobin Concentration : 33.9 gm/dL  Auto Neutrophil # : 4.3 K/uL  Auto Lymphocyte # : 0.8 K/uL  Auto Monocyte # : 0.6 K/uL  Auto Eosinophil # : 0.1 K/uL  Auto Basophil # : 0.0 K/uL  Auto Neutrophil % : 75.0 %  Auto Lymphocyte % : 14.0 %  Auto Monocyte % : 9.7 %  Auto Eosinophil % : 0.9 %  Auto Basophil % : 0.4 %    08-19    137  |  105  |  13  ----------------------------<  93  3.9   |  22  |  0.58    Ca    8.5      19 Aug 2018 09:02  Phos  3.6     08-19  Mg     2.2     08-19    TPro  5.6<L>  /  Alb  3.5  /  TBili  0.3  /  DBili  x   /  AST  25  /  ALT  18  /  AlkPhos  42  08-19    PT/INR - ( 18 Aug 2018 22:01 )   PT: 11.0 sec;   INR: 1.01 ratio         PTT - ( 18 Aug 2018 22:01 )  PTT:30.0 sec      ROS:  Negative except for:    PAST MEDICAL & SURGICAL HISTORY:  Breast cancer  No significant past surgical history      SOCIAL HISTORY:    FAMILY HISTORY:  Family history of breast cancer (Aunt)      Allergies    No Known Allergies    Intolerances        PHYSICAL EXAM  General: adult in NAD  HEENT: clear oropharynx, anicteric sclera, pink conjunctivae  Neck: supple  CV: normal S1S2 with no murmur rubs or gallops  Lungs: clear to auscultation, no wheezes, no rales  Abdomen: soft non-tender non-distended, no hepato/splenomegaly  Ext: no clubbing cyanosis or edema  Skin: no rashes and no petechiae the above rash/cellulitis was no longer visible at the time of my visit.  Neuro: alert and oriented X3 no focal deficits    BLOOD SMEAR INTERPRETATION:    RADIOLOGY :

## 2018-08-20 NOTE — PROGRESS NOTE ADULT - SUBJECTIVE AND OBJECTIVE BOX
Patient is a 48y old  Female who presents with a chief complaint of LE redness (19 Aug 2018 02:03)      SUBJECTIVE / OVERNIGHT EVENTS:    Patient seen and examined. states left leg pain and swelling improved. eager to go home. afebrile. no cp sob.      Vital Signs Last 24 Hrs  T(C): 36.2 (20 Aug 2018 06:02), Max: 37 (19 Aug 2018 17:22)  T(F): 97.2 (20 Aug 2018 06:02), Max: 98.6 (19 Aug 2018 17:22)  HR: 73 (20 Aug 2018 06:02) (73 - 86)  BP: 105/66 (20 Aug 2018 06:02) (98/62 - 143/81)  BP(mean): --  RR: 18 (20 Aug 2018 06:02) (18 - 18)  SpO2: 98% (20 Aug 2018 06:02) (97% - 99%)  I&O's Summary    19 Aug 2018 07:01  -  20 Aug 2018 07:00  --------------------------------------------------------  IN: 680 mL / OUT: 575 mL / NET: 105 mL        PE:  GENERAL: NAD, AAOx3  HEAD:  Atraumatic, Normocephalic  EYES: EOMI, PERRLA, conjunctiva and sclera clear  NECK: Supple, No JVD  CHEST/LUNG: CTABL, No wheeze  HEART: Regular rate and rhythm; no murmur  ABDOMEN: Soft, Nontender, Nondistended; Bowel sounds present  EXTREMITIES:  2+ Peripheral Pulses, left leg swelling improved, no erythema, no warmth  SKIN: No rashes or lesions  NEURO: No focal deficits    LABS:                        9.5    5.8   )-----------( 345      ( 19 Aug 2018 07:42 )             28.0     08-19    137  |  105  |  13  ----------------------------<  93  3.9   |  22  |  0.58    Ca    8.5      19 Aug 2018 09:02  Phos  3.6     08-19  Mg     2.2     08-19    TPro  5.6<L>  /  Alb  3.5  /  TBili  0.3  /  DBili  x   /  AST  25  /  ALT  18  /  AlkPhos  42  08-19    PT/INR - ( 18 Aug 2018 22:01 )   PT: 11.0 sec;   INR: 1.01 ratio         PTT - ( 18 Aug 2018 22:01 )  PTT:30.0 sec  CAPILLARY BLOOD GLUCOSE        CARDIAC MARKERS ( 18 Aug 2018 22:01 )  x     / x     / x     / x     / 1.0 ng/mL          RADIOLOGY & ADDITIONAL TESTS:    Imaging Personally Reviewed:  [x] YES  [ ] NO    Consultant(s) Notes Reviewed:  [x] YES  [ ] NO    MEDICATIONS  (STANDING):  ampicillin/sulbactam  IVPB 3 Gram(s) IV Intermittent every 6 hours  enoxaparin Injectable 40 milliGRAM(s) SubCutaneous every 24 hours    MEDICATIONS  (PRN):      Care Discussed with Consultants/Other Providers [x] YES  [ ] NO    HEALTH ISSUES - PROBLEM Dx:  Prophylactic measure: Prophylactic measure  Carcinoma of breast metastatic to bone, unspecified laterality: Carcinoma of breast metastatic to bone, unspecified laterality  Cellulitis of lower extremity, unspecified laterality: Cellulitis of lower extremity, unspecified laterality

## 2018-08-20 NOTE — DISCHARGE NOTE ADULT - HOSPITAL COURSE
48 F PMH L sided breast cancer (dx 3/2018, invasive poorly differentiated DCIS, ER/[place] + HER2 equivocal), on chemo, Osseous mets s/p RT to L hip, p/w LE edema.    Admitted for cellulitis. seen by ID. Started on IV antibiotic. Cultures negative. Cellulitis much improved. Ptient needs to F/U with Dr Edmonds oncologist. 48 F PMH L sided breast cancer (dx 3/2018, invasive poorly differentiated DCIS, ER/[place] + HER2 equivocal), on chemo, Osseous mets s/p RT to L hip, p/w LE edema.    Admitted for cellulitis. seen by ID. Started on IV antibiotic. Cultures negative. Cellulitis much improved. Cleared for discharge as per Dr Oscar. Patient needs to F/U with Dr Edmonds oncologist. 48 F PMH L sided breast cancer (dx 3/2018, invasive poorly differentiated DCIS, ER/[place] + HER2 equivocal), on chemo, Osseous mets s/p RT to L hip, p/w LE edema.    Admitted for cellulitis. seen by ID. Started on IV antibiotic. Cultures negative. Cellulitis much improved. to completed augmentin for 5 days outpt. Cleared for discharge as per Dr Oscar. Patient needs to F/U with Dr Edmonds oncologist.

## 2018-08-20 NOTE — DISCHARGE NOTE ADULT - CARE PROVIDER_API CALL
Reggie Mendiola), Hematology; Internal Medicine; Medical Oncology  [address]  [address]  [place] [address]  Phone: [phone]  Fax: [phone]

## 2018-08-20 NOTE — DISCHARGE NOTE ADULT - MEDICATION SUMMARY - MEDICATIONS TO TAKE
I will START or STAY ON the medications listed below when I get home from the hospital:    Augmentin 875 mg-125 mg oral tablet  -- 1 tab(s) by mouth every 12 hours   -- Finish all this medication unless otherwise directed by prescriber.  Take with food or milk.    -- Indication: For Cellulitis of lower extremity, unspecified laterality

## 2018-08-20 NOTE — PROGRESS NOTE ADULT - PROBLEM SELECTOR PLAN 1
cellulitis much improved, redness, warmth, and tenderness resolved  on unasyn, fu ID recs regarding PO abx  BC NTD

## 2018-08-20 NOTE — DISCHARGE NOTE ADULT - ADDITIONAL INSTRUCTIONS
F/U with Dr Edmonds, as you have told me, you have an appointment with oncology on Thursday at 8 am.

## 2018-08-20 NOTE — DISCHARGE NOTE ADULT - CARE PLAN
Principal Discharge DX:	Cellulitis of left lower extremity  Goal:	Resolution  Assessment and plan of treatment:	Take all of your antibiotics as ordered.  Call your Health Care Provider within two days of arriving home to make a follow up appointment within one week.  If the affected cellulitic area increases in redness, warmth, pain or swelling call your Health Care Provider.  If you develop fever, chills, and/or malaise, call your Health Care Provider.  Secondary Diagnosis:	Carcinoma of breast metastatic to bone, unspecified laterality  Assessment and plan of treatment:	F/U with your oncologist

## 2018-08-24 LAB
CULTURE RESULTS: SIGNIFICANT CHANGE UP
CULTURE RESULTS: SIGNIFICANT CHANGE UP
SPECIMEN SOURCE: SIGNIFICANT CHANGE UP
SPECIMEN SOURCE: SIGNIFICANT CHANGE UP

## 2019-01-07 ENCOUNTER — APPOINTMENT (OUTPATIENT)
Dept: ORTHOPEDIC SURGERY | Facility: CLINIC | Age: 49
End: 2019-01-07
Payer: COMMERCIAL

## 2019-01-07 PROCEDURE — 99213 OFFICE O/P EST LOW 20 MIN: CPT

## 2019-01-07 PROCEDURE — 72190 X-RAY EXAM OF PELVIS: CPT

## 2019-01-07 NOTE — HISTORY OF PRESENT ILLNESS
[FreeTextEntry1] : Patient is doing well. She recently finished chemotherapy and is now only on bone anti-resorptive medications. She has no pain and is walking well without any problems. She is here for routine followup. [Stable] : stable [0] : currently ~his/her~ pain is 0 out of 10

## 2019-01-07 NOTE — PHYSICAL EXAM
[FreeTextEntry1] : On exam, patient's knee looks a lot better. She is walking around without any assistive device and without pain. She occasionally gets sore when sitting for an extended amount of time but otherwise is intact [General Appearance - Well-Appearing] : Well appearing [General Appearance - Well Nourished] : well nourished [Normal Station and Gait] : gait and station were normal [Tenderness] : no tenderness [Masses] : no masses [Skin Changes - Describe changes:] : No skin changes noted [Full ROM Unless otherwise noted:] : Full range of motion unless otherwise noted: [LE  Motor Strength Normal unless otherwise noted:] : 5/5 strength in bilateral lower extemities unless otherwise noted. [Normal] : Sensation intact to light touch.

## 2019-01-07 NOTE — REVIEW OF SYSTEMS
[Chills] : no chills [Feeling Tired] : feeling tired [Fever] : no fever [Joint Pain] : no joint pain [Breast Pain] : breast pain [Breast Lump] : breast lump [Nl] : Hematologic/Lymphatic

## 2019-01-07 NOTE — DISCUSSION/SUMMARY
[All Questions Answered] : Patient (and family) had all questions answered to an agreeable level of satisfaction [Interested in Proceeding] : Patient (and family) expressed understanding and interest in proceeding with the plan as outlined [de-identified] : Patient was very good at this point and she seems to have healed appropriately. The radiation and chemotherapy seem to have given the time for the bone to reconstitute. She has some medialization but is not in protrusio. I'll see her back on a p.r.n. basis. We did discuss that she may need arthroplasty in the future.\par \par If imaging was ordered, the patient was told to make an appointment to review findings right after all imaging is completed.\par \par We discussed risks, benefits and alternatives. Rationale of care was reviewed and all questions were answered.

## 2019-01-07 NOTE — DATA REVIEWED
[Imaging Present] : Present [de-identified] : X-rays today AP pelvis and today show that the ilioinguinal and ilioischial lines are intact. The dome has also reconstituted.

## 2019-04-04 PROBLEM — C50.919 MALIGNANT NEOPLASM OF UNSPECIFIED SITE OF UNSPECIFIED FEMALE BREAST: Chronic | Status: ACTIVE | Noted: 2018-08-19

## 2019-04-08 ENCOUNTER — APPOINTMENT (OUTPATIENT)
Dept: ULTRASOUND IMAGING | Facility: CLINIC | Age: 49
End: 2019-04-08
Payer: COMMERCIAL

## 2019-04-08 ENCOUNTER — OUTPATIENT (OUTPATIENT)
Dept: OUTPATIENT SERVICES | Facility: HOSPITAL | Age: 49
LOS: 1 days | End: 2019-04-08
Payer: COMMERCIAL

## 2019-04-08 ENCOUNTER — APPOINTMENT (OUTPATIENT)
Dept: MAMMOGRAPHY | Facility: CLINIC | Age: 49
End: 2019-04-08
Payer: COMMERCIAL

## 2019-04-08 DIAGNOSIS — C50.919 MALIGNANT NEOPLASM OF UNSPECIFIED SITE OF UNSPECIFIED FEMALE BREAST: ICD-10-CM

## 2019-04-08 DIAGNOSIS — Z00.8 ENCOUNTER FOR OTHER GENERAL EXAMINATION: ICD-10-CM

## 2019-04-08 PROCEDURE — 77066 DX MAMMO INCL CAD BI: CPT

## 2019-04-08 PROCEDURE — 77066 DX MAMMO INCL CAD BI: CPT | Mod: 26

## 2019-04-08 PROCEDURE — 77062 BREAST TOMOSYNTHESIS BI: CPT | Mod: 26

## 2019-04-08 PROCEDURE — 76641 ULTRASOUND BREAST COMPLETE: CPT

## 2019-04-08 PROCEDURE — G0279: CPT | Mod: 26

## 2019-04-08 PROCEDURE — 76641 ULTRASOUND BREAST COMPLETE: CPT | Mod: 26,50

## 2019-04-08 PROCEDURE — G0279: CPT

## 2019-04-09 ENCOUNTER — TRANSCRIPTION ENCOUNTER (OUTPATIENT)
Age: 49
End: 2019-04-09

## 2019-05-02 ENCOUNTER — TRANSCRIPTION ENCOUNTER (OUTPATIENT)
Age: 49
End: 2019-05-02

## 2020-02-03 ENCOUNTER — APPOINTMENT (OUTPATIENT)
Dept: ORTHOPEDIC SURGERY | Facility: CLINIC | Age: 50
End: 2020-02-03
Payer: COMMERCIAL

## 2020-02-03 DIAGNOSIS — C79.51 SECONDARY MALIGNANT NEOPLASM OF BONE: ICD-10-CM

## 2020-02-03 DIAGNOSIS — C50.919 MALIGNANT NEOPLASM OF UNSPECIFIED SITE OF UNSPECIFIED FEMALE BREAST: ICD-10-CM

## 2020-02-03 DIAGNOSIS — M84.454A PATHOLOGICAL FRACTURE, PELVIS, INITIAL ENCOUNTER FOR FRACTURE: ICD-10-CM

## 2020-02-03 PROCEDURE — 72190 X-RAY EXAM OF PELVIS: CPT

## 2020-02-03 PROCEDURE — 99213 OFFICE O/P EST LOW 20 MIN: CPT

## 2020-02-13 NOTE — REVIEW OF SYSTEMS
[Fever] : no fever [Chills] : no chills [Feeling Tired] : feeling tired [Joint Pain] : no joint pain [Breast Lump] : breast lump [Breast Pain] : breast pain [Nl] : Hematologic/Lymphatic

## 2020-02-13 NOTE — DATA REVIEWED
[de-identified] : X-rays today AP and Judet views of bilateral hips show the LEFT acetabulum with the evidence of the previous metastatic lesion. The subchondral bone has completely recovered and there is much more bone formation and before. Overall there as better bone than before. The hip is in good alignment. There is minimal protrusio. There is no degenerative changes seen. [Imaging Present] : Present

## 2020-02-13 NOTE — DISCUSSION/SUMMARY
[Interested in Proceeding] : Patient (and family) expressed understanding and interest in proceeding with the plan as outlined [All Questions Answered] : Patient (and family) had all questions answered to an agreeable level of satisfaction [de-identified] : Patient is completely stable with x-rays showing improved function. She may have degenerative changes especially related to radiation the future however has no pain and no problems were now para consider back on a p.r.n. basis should she have any problems for this in the future. Ecchymosis or for any other metastatic bone lesions.\par \par If imaging was ordered, the patient was told to make an appointment to review findings right after all imaging is completed.\par \par We discussed risks, benefits and alternatives. Rationale of care was reviewed and all questions were answered. Patient (and family) had all questions answered to her degree of the level of satisfaction. Patient (and family) expressed understanding and interest in proceeding with the plan as outlined.\par \par \par \par \par This note was done with a voice recognition transcription software and any typos are related to this rather than medical error.

## 2020-02-13 NOTE — PHYSICAL EXAM
[FreeTextEntry1] : On exam patient stands in good balance. she is able to walk appropriately has full symmetric range of motion bilateral hips. She has no tenderness and is able to walk appropriately. [Oriented To Time, Place, And Person] : Oriented to person, place, and time [General Appearance - Well-Appearing] : Well appearing [Impaired Insight] : Insight and judgment were intact [Mood] : the mood was normal [Affect] : The affect was normal. [Sclera] : the sclera and conjunctiva were normal [] : No respiratory distress [Neck Cervical Mass (___cm)] : no neck mass was observed [Heart Rate And Rhythm] : heart rate was normal and rhythm regular [Abdomen Soft] : Soft [Normal Station and Gait] : gait and station were normal [Masses] : no masses [Tenderness] : no tenderness [Skin Changes - Describe changes:] : No skin changes noted [Full ROM Unless otherwise noted:] : Full range of motion unless otherwise noted: [LE  Motor Strength Normal unless otherwise noted:] : 5/5 strength in bilateral lower extemities unless otherwise noted. [Normal] : Sensation intact to light touch.

## 2020-02-13 NOTE — HISTORY OF PRESENT ILLNESS
[FreeTextEntry1] : Patient is doing very well despite this she has no complaints. She is here for followup of her previous pathologic problems with her LEFT acetabulum that had radiation. She is stating that her cancer is under control there is no new lesions. She has no pain at all in her hip is able to walk and move appropriately. [Stable] : stable [1] : currently ~his/her~ pain is 1 out of 10 [Walking] : walking

## 2020-03-12 ENCOUNTER — APPOINTMENT (OUTPATIENT)
Dept: NEUROLOGY | Facility: CLINIC | Age: 50
End: 2020-03-12
Payer: COMMERCIAL

## 2020-03-12 ENCOUNTER — OUTPATIENT (OUTPATIENT)
Dept: OUTPATIENT SERVICES | Facility: HOSPITAL | Age: 50
LOS: 1 days | End: 2020-03-12
Payer: COMMERCIAL

## 2020-03-12 ENCOUNTER — APPOINTMENT (OUTPATIENT)
Dept: ULTRASOUND IMAGING | Facility: IMAGING CENTER | Age: 50
End: 2020-03-12
Payer: COMMERCIAL

## 2020-03-12 VITALS
TEMPERATURE: 97.4 F | SYSTOLIC BLOOD PRESSURE: 143 MMHG | RESPIRATION RATE: 16 BRPM | HEART RATE: 82 BPM | WEIGHT: 125 LBS | OXYGEN SATURATION: 98 % | HEIGHT: 61 IN | BODY MASS INDEX: 23.6 KG/M2 | DIASTOLIC BLOOD PRESSURE: 88 MMHG

## 2020-03-12 DIAGNOSIS — C79.51 SECONDARY MALIGNANT NEOPLASM OF BONE: ICD-10-CM

## 2020-03-12 DIAGNOSIS — C50.919 MALIGNANT NEOPLASM OF UNSPECIFIED SITE OF UNSPECIFIED FEMALE BREAST: ICD-10-CM

## 2020-03-12 PROCEDURE — 93970 EXTREMITY STUDY: CPT

## 2020-03-12 PROCEDURE — 93970 EXTREMITY STUDY: CPT | Mod: 26

## 2020-03-12 PROCEDURE — 99205 OFFICE O/P NEW HI 60 MIN: CPT

## 2020-03-12 RX ORDER — OMEGA-3-ACID ETHYL ESTERS CAPSULES 1 G/1
1 CAPSULE, LIQUID FILLED ORAL
Refills: 0 | Status: DISCONTINUED | COMMUNITY
End: 2020-03-12

## 2020-03-12 RX ORDER — TRASTUZUMAB 150 MG/7.4ML
150 INJECTION, POWDER, LYOPHILIZED, FOR SOLUTION INTRAVENOUS
Qty: 30 | Refills: 0 | Status: ACTIVE | COMMUNITY
Start: 2020-03-12

## 2020-03-12 RX ORDER — ACETAMINOPHEN 500 MG/1
TABLET ORAL
Refills: 0 | Status: DISCONTINUED | COMMUNITY
End: 2020-03-12

## 2020-03-12 RX ORDER — PERTUZUMAB 30 MG/ML
420 INJECTION, SOLUTION, CONCENTRATE INTRAVENOUS
Qty: 2 | Refills: 0 | Status: ACTIVE | COMMUNITY
Start: 2020-03-12

## 2020-03-12 RX ORDER — OXYCODONE HYDROCHLORIDE AND ACETAMINOPHEN 5; 325 MG/1; MG/1
5-325 TABLET ORAL
Refills: 0 | Status: DISCONTINUED | COMMUNITY
End: 2020-03-12

## 2020-03-12 NOTE — HISTORY OF PRESENT ILLNESS
[FreeTextEntry1] : Mrs. Loaiza is a 50 yo right handed woman referred by Dr. Mcleod for evaluation of an abnormality noted on MRI LS spine.\par \par PMH relevant for breast ca - diagnosed in 3/18 - presented with a left breast mass - invasive poorly differentiated DCIS, ER/[place] +, Her 2 brijesh equivocal - has known osseous mets - presented with mass as well as increasing left hip and shoulder pain. Details of treatment are pending receipt of records but she did receive RT to the hip and has been on chemotherapy.\par \par Patient is not able to provide many details. She went to see Dr. Mcleod a few weeks ago for persistent left ankle swelling. An MRI of the lumbar spine was performed without contrast on 3/2. I have personally reviewed this MRI - this is a non-contrast study - marrow signal is abnormal reflecting known osseous metastases. There is an ovoid lesion - high T2 signal - noted posterior to the L2 vertebral body - measured as 12 x 6 x 6 mm.\par \par This prompted MRI of the brain with and without contrast on 3/10 - I have personally reviewed this film which has no abnormal enhancement.\par \par MRI Cervical spine and thoracic spine with and without contrast were also performed and personally reviewed  - no other areas of nodularity are noted.

## 2020-03-12 NOTE — DISCUSSION/SUMMARY
[FreeTextEntry1] : I will addend this note when history is received - for now - this is a 48 yo woman with metastatic breast ca - LN and bone with left ankle swelling and left proximal LE weakness - MRI without contrast of LS spine with an ovoid nodule @ L2.\par \par \par LE ultrasound - R/O DVT.\par MRI LS spine with contrast.

## 2020-03-12 NOTE — PHYSICAL EXAM
[FreeTextEntry1] : Through  services:\par Patient is awake and alert with fluent speech and normal reception.\par PERRL, EOMI, VFF\par Face is symmetric and tongue protrudes midline\par There is no pronator drift\par FFM are equal bilaterally\par UE are strong in all muscle groups bilaterally\par LLE - hip flexion on the left is 4+/5 - remainder of motor exam is normal.\par Reflexes are 1+ bilateral biceps, triceps, and brachioradialis.\par Patellar reflexes are absent bilaterally\par Toes are down going bilaterally\par Left ankle swelling noted.\par She is able to walk independently, briskly, and safely.

## 2020-03-17 ENCOUNTER — APPOINTMENT (OUTPATIENT)
Dept: MRI IMAGING | Facility: IMAGING CENTER | Age: 50
End: 2020-03-17
Payer: COMMERCIAL

## 2020-03-17 ENCOUNTER — RESULT REVIEW (OUTPATIENT)
Age: 50
End: 2020-03-17

## 2020-03-17 ENCOUNTER — OUTPATIENT (OUTPATIENT)
Dept: OUTPATIENT SERVICES | Facility: HOSPITAL | Age: 50
LOS: 1 days | End: 2020-03-17
Payer: COMMERCIAL

## 2020-03-17 DIAGNOSIS — C50.919 MALIGNANT NEOPLASM OF UNSPECIFIED SITE OF UNSPECIFIED FEMALE BREAST: ICD-10-CM

## 2020-03-17 DIAGNOSIS — C79.51 SECONDARY MALIGNANT NEOPLASM OF BONE: ICD-10-CM

## 2020-03-17 PROCEDURE — A9585: CPT

## 2020-03-17 PROCEDURE — 72158 MRI LUMBAR SPINE W/O & W/DYE: CPT

## 2020-03-17 PROCEDURE — 72158 MRI LUMBAR SPINE W/O & W/DYE: CPT | Mod: 26

## 2020-03-18 ENCOUNTER — APPOINTMENT (OUTPATIENT)
Dept: NEUROLOGY | Facility: CLINIC | Age: 50
End: 2020-03-18

## 2020-04-14 ENCOUNTER — APPOINTMENT (OUTPATIENT)
Dept: NEUROLOGY | Facility: CLINIC | Age: 50
End: 2020-04-14
Payer: COMMERCIAL

## 2020-04-14 PROCEDURE — 99215 OFFICE O/P EST HI 40 MIN: CPT | Mod: 95

## 2020-04-14 NOTE — PHYSICAL EXAM
[FreeTextEntry1] : Patient has normal expressive and receptive speech \par She is able to follow commands well.\par She can lift both UE over her head - no pronator drift noted - FFM equal bilaterally\par LE strong - walking independently and steadily - able to toe and heel walk well

## 2020-04-14 NOTE — HISTORY OF PRESENT ILLNESS
[FreeTextEntry1] : Mrs. Loaiza is a 48 yo right handed woman referred by Dr. Mcleod for evaluation of an abnormality noted on MRI LS spine.\par \par PMH relevant for breast ca - diagnosed in 3/18 - presented with a left breast mass - invasive poorly differentiated DCIS, ER/[place] +, Her 2 brijesh equivocal - has known osseous mets - presented with mass as well as increasing left hip and shoulder pain. Details of treatment are pending receipt of records but she did receive RT to the hip and has been on chemotherapy.\par \par Patient is not able to provide many details. She went to see Dr. Mcleod a few weeks ago for persistent left ankle swelling. An MRI of the lumbar spine was performed without contrast on 3/2. I have personally reviewed this MRI - this is a non-contrast study - marrow signal is abnormal reflecting known osseous metastases. There is an ovoid lesion - high T2 signal - noted posterior to the L2 vertebral body - measured as 12 x 6 x 6 mm.\par \par This prompted MRI of the brain with and without contrast on 3/10 - I have personally reviewed this film which has no abnormal enhancement.\par \par MRI Cervical spine and thoracic spine with and without contrast were also performed and personally reviewed  - no other areas of nodularity are noted.

## 2020-04-14 NOTE — DISCUSSION/SUMMARY
[FreeTextEntry1] : LE ultrasound 3/12 - no blood clot\par MRI LS spine with contrast 3/17 personally reviewed - an ovoid lesion is seen intradurally adjacent to the conus at L2 - it does not enhance.\par She has no leg weakness from this - she has no bowel or bladder change. There are no prior LS spine imaging to compare before March. Dr. Edmonds will send me her prior PET scan.\par Will present at tumor board.\par

## 2020-04-23 NOTE — DISCUSSION/SUMMARY
[FreeTextEntry1] : Case discussed at tumor board.\par Characteristics do not appear malignant.\par Would like to review prior PET scan.\par She is asymptomatic.\par Will repeat MRI in 2 months.\par If she has any issues in the interim, she knows to call me.

## 2020-04-23 NOTE — HISTORY OF PRESENT ILLNESS
[FreeTextEntry1] : Mrs. Loaiza is a 48 yo right handed woman referred by Dr. Mcleod for evaluation of an abnormality noted on MRI LS spine.\par \par PMH relevant for breast ca - diagnosed in 3/18 - presented with a left breast mass - invasive poorly differentiated DCIS, ER/[place] +, Her 2 brijesh equivocal - has known osseous mets - presented with mass as well as increasing left hip and shoulder pain. Details of treatment are pending receipt of records but she did receive RT to the hip and has been on chemotherapy.\par \par Patient is not able to provide many details. She went to see Dr. Mcleod a few weeks ago for persistent left ankle swelling. An MRI of the lumbar spine was performed without contrast on 3/2. I have personally reviewed this MRI - this is a non-contrast study - marrow signal is abnormal reflecting known osseous metastases. There is an ovoid lesion - high T2 signal - noted posterior to the L2 vertebral body - measured as 12 x 6 x 6 mm.\par \par This prompted MRI of the brain with and without contrast on 3/10 - I have personally reviewed this film which has no abnormal enhancement.\par \par MRI Cervical spine and thoracic spine with and without contrast were also performed and personally reviewed - no other areas of nodularity are noted. \par Plan was made to repeat MRI lumbar spine with contrast. LE ultrasound performed and was negative

## 2020-04-23 NOTE — PHYSICAL EXAM
[FreeTextEntry1] : She is awake and alert - in [place] she is oriented and fluent - able to follow commands.\par EOMI\par Face appears symmetric and tongue protrudes midline.\par No drift\par FFM and LUIS full\par Ambulating independently.

## 2020-07-24 ENCOUNTER — EMERGENCY (EMERGENCY)
Facility: HOSPITAL | Age: 50
LOS: 1 days | Discharge: HOME | End: 2020-07-24
Attending: PERSONAL EMERGENCY RESPONSE ATTENDANT
Payer: COMMERCIAL

## 2020-07-24 VITALS
SYSTOLIC BLOOD PRESSURE: 162 MMHG | WEIGHT: 126.99 LBS | HEIGHT: 61 IN | RESPIRATION RATE: 20 BRPM | TEMPERATURE: 98 F | OXYGEN SATURATION: 100 % | HEART RATE: 78 BPM | DIASTOLIC BLOOD PRESSURE: 102 MMHG

## 2020-07-24 LAB
ALBUMIN SERPL ELPH-MCNC: 4.2 G/DL — SIGNIFICANT CHANGE UP (ref 3.3–5)
ALP SERPL-CCNC: 51 U/L — SIGNIFICANT CHANGE UP (ref 40–120)
ALT FLD-CCNC: 18 U/L — SIGNIFICANT CHANGE UP (ref 10–45)
ANION GAP SERPL CALC-SCNC: 10 MMOL/L — SIGNIFICANT CHANGE UP (ref 5–17)
APTT BLD: 34.3 SEC — SIGNIFICANT CHANGE UP (ref 27.5–35.5)
AST SERPL-CCNC: 17 U/L — SIGNIFICANT CHANGE UP (ref 10–40)
BASOPHILS # BLD AUTO: 0.02 K/UL — SIGNIFICANT CHANGE UP (ref 0–0.2)
BASOPHILS NFR BLD AUTO: 0.3 % — SIGNIFICANT CHANGE UP (ref 0–2)
BILIRUB SERPL-MCNC: 0.5 MG/DL — SIGNIFICANT CHANGE UP (ref 0.2–1.2)
BUN SERPL-MCNC: 6 MG/DL — LOW (ref 7–23)
CALCIUM SERPL-MCNC: 9.1 MG/DL — SIGNIFICANT CHANGE UP (ref 8.4–10.5)
CHLORIDE SERPL-SCNC: 101 MMOL/L — SIGNIFICANT CHANGE UP (ref 96–108)
CK SERPL-CCNC: 77 U/L — SIGNIFICANT CHANGE UP (ref 25–170)
CO2 SERPL-SCNC: 27 MMOL/L — SIGNIFICANT CHANGE UP (ref 22–31)
CREAT SERPL-MCNC: 0.58 MG/DL — SIGNIFICANT CHANGE UP (ref 0.5–1.3)
EOSINOPHIL # BLD AUTO: 0.08 K/UL — SIGNIFICANT CHANGE UP (ref 0–0.5)
EOSINOPHIL NFR BLD AUTO: 1.3 % — SIGNIFICANT CHANGE UP (ref 0–6)
GLUCOSE SERPL-MCNC: 97 MG/DL — SIGNIFICANT CHANGE UP (ref 70–99)
HCT VFR BLD CALC: 37.5 % — SIGNIFICANT CHANGE UP (ref 34.5–45)
HGB BLD-MCNC: 12.4 G/DL — SIGNIFICANT CHANGE UP (ref 11.5–15.5)
IMM GRANULOCYTES NFR BLD AUTO: 0.3 % — SIGNIFICANT CHANGE UP (ref 0–1.5)
INR BLD: 0.98 RATIO — SIGNIFICANT CHANGE UP (ref 0.88–1.16)
LYMPHOCYTES # BLD AUTO: 1.58 K/UL — SIGNIFICANT CHANGE UP (ref 1–3.3)
LYMPHOCYTES # BLD AUTO: 26.6 % — SIGNIFICANT CHANGE UP (ref 13–44)
MCHC RBC-ENTMCNC: 31.5 PG — SIGNIFICANT CHANGE UP (ref 27–34)
MCHC RBC-ENTMCNC: 33.1 GM/DL — SIGNIFICANT CHANGE UP (ref 32–36)
MCV RBC AUTO: 95.2 FL — SIGNIFICANT CHANGE UP (ref 80–100)
MONOCYTES # BLD AUTO: 0.45 K/UL — SIGNIFICANT CHANGE UP (ref 0–0.9)
MONOCYTES NFR BLD AUTO: 7.6 % — SIGNIFICANT CHANGE UP (ref 2–14)
NEUTROPHILS # BLD AUTO: 3.8 K/UL — SIGNIFICANT CHANGE UP (ref 1.8–7.4)
NEUTROPHILS NFR BLD AUTO: 63.9 % — SIGNIFICANT CHANGE UP (ref 43–77)
NRBC # BLD: 0 /100 WBCS — SIGNIFICANT CHANGE UP (ref 0–0)
PLATELET # BLD AUTO: 327 K/UL — SIGNIFICANT CHANGE UP (ref 150–400)
POTASSIUM SERPL-MCNC: 4.3 MMOL/L — SIGNIFICANT CHANGE UP (ref 3.5–5.3)
POTASSIUM SERPL-SCNC: 4.3 MMOL/L — SIGNIFICANT CHANGE UP (ref 3.5–5.3)
PROT SERPL-MCNC: 7.1 G/DL — SIGNIFICANT CHANGE UP (ref 6–8.3)
PROTHROM AB SERPL-ACNC: 11.7 SEC — SIGNIFICANT CHANGE UP (ref 10.6–13.6)
RBC # BLD: 3.94 M/UL — SIGNIFICANT CHANGE UP (ref 3.8–5.2)
RBC # FLD: 13 % — SIGNIFICANT CHANGE UP (ref 10.3–14.5)
SODIUM SERPL-SCNC: 138 MMOL/L — SIGNIFICANT CHANGE UP (ref 135–145)
WBC # BLD: 5.95 K/UL — SIGNIFICANT CHANGE UP (ref 3.8–10.5)
WBC # FLD AUTO: 5.95 K/UL — SIGNIFICANT CHANGE UP (ref 3.8–10.5)

## 2020-07-24 PROCEDURE — 73552 X-RAY EXAM OF FEMUR 2/>: CPT | Mod: 26,LT

## 2020-07-24 PROCEDURE — 80053 COMPREHEN METABOLIC PANEL: CPT

## 2020-07-24 PROCEDURE — 85730 THROMBOPLASTIN TIME PARTIAL: CPT

## 2020-07-24 PROCEDURE — 82550 ASSAY OF CK (CPK): CPT

## 2020-07-24 PROCEDURE — 96374 THER/PROPH/DIAG INJ IV PUSH: CPT

## 2020-07-24 PROCEDURE — 73552 X-RAY EXAM OF FEMUR 2/>: CPT

## 2020-07-24 PROCEDURE — 85027 COMPLETE CBC AUTOMATED: CPT

## 2020-07-24 PROCEDURE — 99285 EMERGENCY DEPT VISIT HI MDM: CPT

## 2020-07-24 PROCEDURE — 93971 EXTREMITY STUDY: CPT | Mod: 26

## 2020-07-24 PROCEDURE — 99284 EMERGENCY DEPT VISIT MOD MDM: CPT | Mod: 25

## 2020-07-24 PROCEDURE — 85610 PROTHROMBIN TIME: CPT

## 2020-07-24 PROCEDURE — 93971 EXTREMITY STUDY: CPT

## 2020-07-24 RX ORDER — ACETAMINOPHEN 500 MG
975 TABLET ORAL ONCE
Refills: 0 | Status: COMPLETED | OUTPATIENT
Start: 2020-07-24 | End: 2020-07-24

## 2020-07-24 RX ORDER — VANCOMYCIN HCL 1 G
1000 VIAL (EA) INTRAVENOUS ONCE
Refills: 0 | Status: COMPLETED | OUTPATIENT
Start: 2020-07-24 | End: 2020-07-24

## 2020-07-24 RX ORDER — CEPHALEXIN 500 MG
1 CAPSULE ORAL
Qty: 28 | Refills: 0
Start: 2020-07-24 | End: 2020-07-30

## 2020-07-24 RX ADMIN — Medication 975 MILLIGRAM(S): at 21:11

## 2020-07-24 RX ADMIN — Medication 975 MILLIGRAM(S): at 22:56

## 2020-07-24 RX ADMIN — Medication 250 MILLIGRAM(S): at 22:57

## 2020-07-24 NOTE — ED PROVIDER NOTE - ATTENDING CONTRIBUTION TO CARE
Attending MD Loera.  Agree with above.  Pt is a 51 yo female with pmhx of breast CA with mets to bones s/p chemo and radiation therapy.  Presents today with complaint of L thigh redness that began yesterday with inc swelling and erythema to anterior thigh.  Denies fevers/chills/sob/cough but endorses pain at site.  Pt takes no blood thinners. VSs non-actionable.  Rash is blanching and erythematous, entire LLE is swollen compared with RLE.  Pt states that swelling is not baseline.  Concern for cellulitis vs. DVT.  Pt remains ambulatory. Attending MD Loera.  Agree with above.  Pt is a 51 yo female with pmhx of breast CA with mets to bones s/p chemo and radiation therapy.  Presents today with complaint of L thigh redness that began yesterday with inc swelling and erythema to anterior thigh.  Denies fevers/chills/sob/cough but endorses pain at site.  Pt takes no blood thinners. VSs non-actionable.  Rash is blanching and erythematous, entire LLE is swollen compared with RLE.  Pt states that swelling is not baseline.  Concern for cellulitis vs. DVT.  Pt remains ambulatory.    Following non-actionable sono and XR pt states LLE is always as swollen as it is currently and main concern with inc erythema/warmth and mild pain over mid-thigh.  Area of erythema is not well demarcated, mildly warm, no sig TTP, no induration/crepitus.  Does not extend to groin/inguinal ligament.  Pt is neurovascularly intact distal to site. Labs/VS's non-actionable.  Shared decision making.  Pt offered CDU/obs stay for IV abxs and prob d/c tomorrow with PO abxs but prefers d/c home with PO abxs and verbalizes understanding of need to return to ED immediately for any fevers, inc pain, inc area of erythema, inc swelling, inability to tolerate PO or should she begin to feel weak or ill.  Follow-up with PCP/onc/neuro this week as scheduled.  Complete course of abxs exactly as written.  Pt verbalizes understanding of above return precautions and follow-up plan.

## 2020-07-24 NOTE — ED PROVIDER NOTE - CROS ED ROS STATEMENT
PICC Line Insertion Procedure Note    Procedure: Insertion of #5F [id number]  PICC    Indications:  Long Term IV therapy    Procedure Details   Informed consent was obtained for the procedure, including sedation.  Risks of lung perforation, hemorrhage, and adverse drug reaction were discussed.     Maximum sterile technique was used including antiseptics, cap, gloves, gown, hand hygiene, mask and sheet.    #5F PICC inserted to the R Basilic vein per hospital protocol.   Blood return:  Yes    Findings:  Catheter inserted to 53 cm, with 3 cm. Exposed. Mid upper arm circumference is 34 cm.  There were no changes to vital signs. Catheter was flushed with 20 cc NS. Patient did tolerate procedure well. Sherlock 3cg used to place and position line. ecg tracing placed in pt's blue folder. NAN Vasquez is aware that line is ok to use.    Recommendations:  Risks and benefits explained to pt. Pt is agreeable to continue with line placement at time of consent.  PICC Brochure given to patient with teaching instruction.   all other ROS negative except as per HPI

## 2020-07-24 NOTE — ED PROVIDER NOTE - ATTESTATION, MLM
[de-identified] : The patient returns today for initial routine evaluation after R KALPANA revision 12/19/19. Excellent progress is noted in terms of pain and restoration of function. Pain is well controlled with oral medications. There has been no change in medical health since discharge. The patient does not require assistive devices.  She is taking ASA 81mg BID for DVT ppx.  She  has been doing the home exercise and will be starting outpatient PT.\par \par 
I have reviewed and confirmed nurses' notes for patient's medications, allergies, medical history, and surgical history.

## 2020-07-24 NOTE — ED PROVIDER NOTE - NSFOLLOWUPINSTRUCTIONS_ED_ALL_ED_FT
Cellulitis    Cellulitis is a skin infection caused by bacteria. This condition occurs most often in the arms and lower legs but can occur anywhere over the body. Symptoms include redness, swelling, warm skin, tenderness, and chills/fever. If you were prescribed an antibiotic medicine, take it as told by your health care provider. Do not stop taking the antibiotic even if you start to feel better.    SEEK IMMEDIATE MEDICAL CARE IF YOU HAVE ANY OF THE FOLLOWING SYMPTOMS: worsening fever, red streaks coming from affected area, vomiting or diarrhea, or dizziness/lightheadedness.     -Please follow up with your Primary Care Doctor within 1 wk    -Please take cephalexin 500mg 4x a day for 1 wk

## 2020-07-24 NOTE — ED PROVIDER NOTE - CLINICAL SUMMARY MEDICAL DECISION MAKING FREE TEXT BOX
Williams Valencia (Resident): 50ty F w/ breast CA w/ mets to bone p/w erythematous rash on upper L thigh, likely cellulitis. Concern for DVT due to swelling. Will check basic labs, US lower ext, XR, provide analgesic, and Abx.

## 2020-07-24 NOTE — ED PROVIDER NOTE - PROGRESS NOTE DETAILS
Erik, PGY2: pt informed about negative u/s and xr. feels comfortable going home return precautions given. understands and knows to come back if sxs worsen feels comfortable w/ dc.

## 2020-07-24 NOTE — ED PROVIDER NOTE - PHYSICAL EXAMINATION
GEN: NAD, awake, eyes open spontaneously  HEENT: NCAT, MMM, Trachea midline, normal conjunctiva, perrl  CHEST/LUNGS: Non-tachypneic, CTAB, bilateral breath sounds  CARDIAC: Non-tachycardic, normal perfusion  ABDOMEN: Soft, NTND, No rebound/guarding  MSK: L thigh 10cm x 5cm rash blanching, nontender to palpation, L > R  SKIN: No rashes, no petechiae, no vesicles  NEURO: Full ROM in B/L lower ext, strength 5/5  PSYCH: Alert, appropriate, cooperative, with capacity and insight GEN: NAD, awake, eyes open spontaneously  HEENT: NCAT, MMM, Trachea midline, normal conjunctiva, perrl  CHEST/LUNGS: Non-tachypneic, CTAB, bilateral breath sounds  CARDIAC: Non-tachycardic, normal perfusion  ABDOMEN: Soft, NTND, No rebound/guarding  MSK: nontender to palpation, L > R thigh  SKIN: L anterior thigh rash with no evidence of fluctuance or discharge, no petechiae, no vesicles  NEURO: Full ROM in B/L lower ext, strength 5/5  PSYCH: Alert, appropriate, cooperative, with capacity and insight

## 2020-07-24 NOTE — ED PROVIDER NOTE - OBJECTIVE STATEMENT
50y F Hx of breast CA s/p chemo w/ mets to bone, p/w L thigh pain. Pt states that she noticed erythematous rash on front part of thigh yesterday ascoc. w pain and selling. Denies bug bite, trauma to area. PT able to ambulate, but denies fever and chills. 49y/o F w/ h/o breast CA s/p chemo (last dose 2 wks ago) w/ mets to bone, p/w L thigh pain. Pt states that she noticed erythematous rash on front part of thigh yesterday a/w pain and swelling. Pt able to ambulate without pain. Denies fevers, chills, bug bite, trauma.

## 2020-07-24 NOTE — ED ADULT NURSE NOTE - OBJECTIVE STATEMENT
patient is a 50 year old female with a PMH of breast CA with metastasis to the bone who presents to the ED from home complaining of left thigh pain, redness and left arm numbness and tingling. patient states the left thigh pain began yesterday, patient noted the left thigh getting red, warm to touch and increasingly painful and today patient states pain was accompanied with left arm numbness and tingling and left sided intermittent dull chest pain. patient is aaox3, lungs CTA bilaterally, abd soft, nondistended, nontender, cap refill <3, radial pulses +2 bilaterally. patient denies ha, shortness of breath, dizziness, weakness, fever, cough, chills, n/v/d, abd pain, back pain, changes in bowel or bladder, hematuria, bloody stool. upon assessment patient left thigh is red, warm to touch and painful upon palpation. patient NSR on monitor. IV placed. will continue to monitor. patient comfort and safety provided.

## 2020-07-24 NOTE — ED PROVIDER NOTE - PATIENT PORTAL LINK FT
You can access the FollowMyHealth Patient Portal offered by [facility] by registering at the following website: [email or web address]. By joining [facility]’s FollowMyHealth portal, you will also be able to view your health information using other applications (apps) compatible with our system.

## 2020-07-25 VITALS
DIASTOLIC BLOOD PRESSURE: 81 MMHG | RESPIRATION RATE: 20 BRPM | OXYGEN SATURATION: 100 % | HEART RATE: 72 BPM | SYSTOLIC BLOOD PRESSURE: 125 MMHG

## 2020-07-27 ENCOUNTER — APPOINTMENT (OUTPATIENT)
Dept: NEUROLOGY | Facility: CLINIC | Age: 50
End: 2020-07-27
Payer: COMMERCIAL

## 2020-07-27 VITALS
SYSTOLIC BLOOD PRESSURE: 140 MMHG | HEART RATE: 69 BPM | DIASTOLIC BLOOD PRESSURE: 89 MMHG | RESPIRATION RATE: 18 BRPM | TEMPERATURE: 97.7 F

## 2020-07-27 DIAGNOSIS — Z00.00 ENCOUNTER FOR GENERAL ADULT MEDICAL EXAMINATION W/OUT ABNORMAL FINDINGS: ICD-10-CM

## 2020-07-27 PROCEDURE — 99215 OFFICE O/P EST HI 40 MIN: CPT

## 2020-07-27 NOTE — HISTORY OF PRESENT ILLNESS
[FreeTextEntry1] : Vic Lawrence is a 48 yo right handed woman whom I saw at this office for a neuro oncology follow up\par In brief\par PMH relevant for breast ca - diagnosed in 3/18 - presented with a left breast mass - invasive poorly differentiated DCIS, ER/[place] +, Her 2 brijesh equivocal - has known osseous mets - presented with mass as well as increasing left hip and shoulder pain. Details of treatment are pending receipt of records but she did receive RT to the hip and has been on chemotherapy.\par \par Patient is not able to provide many details. She went to see Dr. Mcleod a few weeks ago for persistent left ankle swelling. An MRI of the lumbar spine was performed without contrast on 3/2. I have personally reviewed this MRI - this is a non-contrast study - marrow signal is abnormal reflecting known osseous metastases. There is an ovoid lesion - high T2 signal - noted posterior to the L2 vertebral body - measured as 12 x 6 x 6 mm.\par \par 3/10/2020- MRI of the brain with and without contrast which has no abnormal enhancement.\par \par MRI Cervical spine and thoracic spine with and without contrast were also performed and personally reviewed - no other areas of nodularity are noted.\par 3/12/2020- Follow up with Dr Burton. Recommended to f/u after doing MR LS w/wo . Bilateral LE US neg\par 3/17/2020 - MRI LS showed an intradurally ovoid lesion adjacent to clonus at L2 - it does not enhance\par 7/27/2020- Patient here for a follow up. She was admitted at [facility] last week sec to Right LE cellulitis. Offers no new complaints

## 2020-07-27 NOTE — PHYSICAL EXAM
[General Appearance - In No Acute Distress] : in no acute distress [General Appearance - Alert] : alert [General Appearance - Well Nourished] : well nourished [Respiration, Rhythm And Depth] : normal respiratory rhythm and effort [] : no respiratory distress [Edema] : there was no peripheral edema [Exaggerated Use Of Accessory Muscles For Inspiration] : no accessory muscle use [Abnormal Walk] : normal gait [FreeTextEntry1] : Patient seen and examined\par Alert, awake , Oriented X 3. Speech clear and fluent\par PERRLA, EOMI, VFF\par Face symmetrical. Tongue protrudes midline\par SARMIENTO x 4 with good and equal strength\par FFM, coordination equal bilaterally\par Sensation intact bilaterally\par Gait steady. Ambulating independently\par

## 2020-07-27 NOTE — DISCUSSION/SUMMARY
[FreeTextEntry1] : Patient seen and examined\par Clinically stable\par Will repeat LS MRI w/wo in August, 2019\par In the interim if any questions patient knows to call me

## 2020-08-31 ENCOUNTER — OUTPATIENT (OUTPATIENT)
Dept: OUTPATIENT SERVICES | Facility: HOSPITAL | Age: 50
LOS: 1 days | End: 2020-08-31
Payer: COMMERCIAL

## 2020-08-31 ENCOUNTER — APPOINTMENT (OUTPATIENT)
Dept: MRI IMAGING | Facility: IMAGING CENTER | Age: 50
End: 2020-08-31
Payer: COMMERCIAL

## 2020-08-31 ENCOUNTER — APPOINTMENT (OUTPATIENT)
Dept: NEUROLOGY | Facility: CLINIC | Age: 50
End: 2020-08-31
Payer: COMMERCIAL

## 2020-08-31 VITALS
HEART RATE: 73 BPM | WEIGHT: 125 LBS | DIASTOLIC BLOOD PRESSURE: 87 MMHG | HEIGHT: 61 IN | TEMPERATURE: 97.5 F | SYSTOLIC BLOOD PRESSURE: 133 MMHG | RESPIRATION RATE: 18 BRPM | BODY MASS INDEX: 23.6 KG/M2

## 2020-08-31 DIAGNOSIS — Z00.8 ENCOUNTER FOR OTHER GENERAL EXAMINATION: ICD-10-CM

## 2020-08-31 PROCEDURE — 72158 MRI LUMBAR SPINE W/O & W/DYE: CPT | Mod: 26

## 2020-08-31 PROCEDURE — 72158 MRI LUMBAR SPINE W/O & W/DYE: CPT

## 2020-08-31 PROCEDURE — A9585: CPT

## 2020-08-31 PROCEDURE — 99215 OFFICE O/P EST HI 40 MIN: CPT

## 2020-08-31 NOTE — PHYSICAL EXAM
[General Appearance - Alert] : alert [General Appearance - In No Acute Distress] : in no acute distress [General Appearance - Well Nourished] : well nourished [Respiration, Rhythm And Depth] : normal respiratory rhythm and effort [] : no respiratory distress [Exaggerated Use Of Accessory Muscles For Inspiration] : no accessory muscle use [Edema] : there was no peripheral edema [FreeTextEntry1] : Patient seen and examined\par Alert, awake , Oriented X 3. Speech clear and fluent\par PERRLA, EOMI, VFF\par Face symmetrical. Tongue protrudes midline\par SARMIENTO x 4 with good and equal strength\par FFM, coordination equal bilaterally\par Reflexes present and symmetric at bilateral biceps, triceps, and brachioradialis 1+\par Absent reflexes at patella bilaterally\par Toes are down\par Sensation intact bilaterally\par Gait steady. Ambulating independently\par

## 2020-08-31 NOTE — HISTORY OF PRESENT ILLNESS
[FreeTextEntry1] : Vic Lawrence is a 50 yo right handed woman whom I saw at this office for a neuro oncology follow up\par In brief\par PMH relevant for breast ca - diagnosed in 3/18 - presented with a left breast mass - invasive poorly differentiated DCIS, ER/[place] +, Her 2 brijesh equivocal - has known osseous mets - presented with mass as well as increasing left hip and shoulder pain. Details of treatment are pending receipt of records but she did receive RT to the hip and has been on chemotherapy.\par \par Patient is not able to provide many details. She went to see Dr. Mcleod a few weeks ago for persistent left ankle swelling. An MRI of the lumbar spine was performed without contrast on 3/2. I have personally reviewed this MRI - this is a non-contrast study - marrow signal is abnormal reflecting known osseous metastases. There is an ovoid lesion - high T2 signal - noted posterior to the L2 vertebral body - measured as 12 x 6 x 6 mm.\par \par 3/10/2020- MRI of the brain with and without contrast which has no abnormal enhancement.\par \par MRI Cervical spine and thoracic spine with and without contrast were also performed and personally reviewed - no other areas of nodularity are noted.\par 3/12/2020- Follow up with Dr Burton. Recommended to f/u after doing MR LS w/wo . Bilateral LE US neg\par 3/17/2020 - MRI LS showed an intradurally ovoid lesion adjacent to clonus at L2 - it does not enhance\par 7/27/2020- Clinically stable\par 8/31/2020- Patient here for a follow up. Offers no new complaints. She has no leg weakness, gait difficulty, or bowel / bladder changes.

## 2020-08-31 NOTE — DISCUSSION/SUMMARY
[FreeTextEntry1] : Patient seen and examined\par Clinically stable.\par I have reviewed her WANDA of the LS spine from today and have compared it to her last exam dated 3/17/2020.\par To my review, it is stable.\par Will follow up official reading.\par Will repeat LS MRI in 6 months.\par In the interim, if any concerns patient knows to call me.

## 2020-10-01 ENCOUNTER — APPOINTMENT (OUTPATIENT)
Dept: MRI IMAGING | Facility: IMAGING CENTER | Age: 50
End: 2020-10-01

## 2020-10-01 ENCOUNTER — APPOINTMENT (OUTPATIENT)
Dept: NEUROLOGY | Facility: CLINIC | Age: 50
End: 2020-10-01

## 2020-10-03 ENCOUNTER — TRANSCRIPTION ENCOUNTER (OUTPATIENT)
Age: 50
End: 2020-10-03

## 2021-03-01 NOTE — HISTORY OF PRESENT ILLNESS
[FreeTextEntry1] : Vic Lawrence is a 49 yo right handed woman whom I saw at this office for a neuro oncology follow up\par In brief\par PMH relevant for breast ca - diagnosed in 3/18 - presented with a left breast mass - invasive poorly differentiated DCIS, ER/[place] +, Her 2 brijesh equivocal - has known osseous mets - presented with mass as well as increasing left hip and shoulder pain. Details of treatment are pending receipt of records but she did receive RT to the hip and has been on chemotherapy.\par \par Patient is not able to provide many details. She went to see Dr. Mcleod a few weeks ago for persistent left ankle swelling. An MRI of the lumbar spine was performed without contrast on 3/2. I have personally reviewed this MRI - this is a non-contrast study - marrow signal is abnormal reflecting known osseous metastases. There is an ovoid lesion - high T2 signal - noted posterior to the L2 vertebral body - measured as 12 x 6 x 6 mm.\par \par 3/10/2020- MRI of the brain with and without contrast which has no abnormal enhancement.\par \par MRI Cervical spine and thoracic spine with and without contrast were also performed and personally reviewed - no other areas of nodularity are noted.\par 3/12/2020- Follow up with Dr Burton. Recommended to f/u after doing MR LS w/wo . Bilateral LE US neg\par 3/17/2020 - MRI LS showed an intradurally ovoid lesion adjacent to clonus at L2 - it does not enhance\par 7/27/2020- Clinically stable\par 8/31/2020- MRI stable\par Denies headaches ,difficulty, or bowel / bladder changes.

## 2021-03-02 ENCOUNTER — APPOINTMENT (OUTPATIENT)
Dept: MRI IMAGING | Facility: IMAGING CENTER | Age: 51
End: 2021-03-02

## 2021-03-02 ENCOUNTER — APPOINTMENT (OUTPATIENT)
Dept: NEUROLOGY | Facility: CLINIC | Age: 51
End: 2021-03-02

## 2022-10-10 NOTE — PATIENT PROFILE ADULT. - BLOOD AVOIDANCE/RESTRICTIONS, PROFILE
Problem: Breathing Pattern Ineffective  Goal: Air exchange is effective, demonstrated by Sp02 sat of greater then or = 92% (or as ordered)  Outcome: Outcome Met, Continue evaluating goal progress toward completion      none

## 2024-09-24 NOTE — ED ADULT TRIAGE NOTE - WEIGHT IN LBS
Received incoming fax from [facility] pharmacy stating that patient is requesting New Rx refills of Metformin HCL 500mg. It was last filled in 2023, wife came in to pharmacy asking for refill. Please send new Rx if appropriate.     Please advise.     Yvette Alaniz CMA    
[phone]
